# Patient Record
Sex: FEMALE | Race: ASIAN | NOT HISPANIC OR LATINO
[De-identification: names, ages, dates, MRNs, and addresses within clinical notes are randomized per-mention and may not be internally consistent; named-entity substitution may affect disease eponyms.]

---

## 2017-09-11 ENCOUNTER — APPOINTMENT (OUTPATIENT)
Dept: OBGYN | Facility: CLINIC | Age: 52
End: 2017-09-11
Payer: COMMERCIAL

## 2017-09-11 PROCEDURE — 99396 PREV VISIT EST AGE 40-64: CPT

## 2017-10-04 ENCOUNTER — APPOINTMENT (OUTPATIENT)
Dept: ULTRASOUND IMAGING | Facility: IMAGING CENTER | Age: 52
End: 2017-10-04
Payer: COMMERCIAL

## 2017-10-04 ENCOUNTER — APPOINTMENT (OUTPATIENT)
Dept: MAMMOGRAPHY | Facility: IMAGING CENTER | Age: 52
End: 2017-10-04
Payer: COMMERCIAL

## 2017-10-04 ENCOUNTER — OUTPATIENT (OUTPATIENT)
Dept: OUTPATIENT SERVICES | Facility: HOSPITAL | Age: 52
LOS: 1 days | End: 2017-10-04
Payer: COMMERCIAL

## 2017-10-04 DIAGNOSIS — Z00.8 ENCOUNTER FOR OTHER GENERAL EXAMINATION: ICD-10-CM

## 2017-10-04 DIAGNOSIS — Z98.89 OTHER SPECIFIED POSTPROCEDURAL STATES: Chronic | ICD-10-CM

## 2017-10-04 PROCEDURE — 76641 ULTRASOUND BREAST COMPLETE: CPT

## 2017-10-04 PROCEDURE — G0202: CPT | Mod: 26

## 2017-10-04 PROCEDURE — 76641 ULTRASOUND BREAST COMPLETE: CPT | Mod: 26,50

## 2017-10-04 PROCEDURE — 77063 BREAST TOMOSYNTHESIS BI: CPT

## 2017-10-04 PROCEDURE — 77063 BREAST TOMOSYNTHESIS BI: CPT | Mod: 26

## 2017-10-04 PROCEDURE — 77067 SCR MAMMO BI INCL CAD: CPT

## 2017-10-11 DIAGNOSIS — Z12.31 ENCOUNTER FOR SCREENING MAMMOGRAM FOR MALIGNANT NEOPLASM OF BREAST: ICD-10-CM

## 2017-10-11 DIAGNOSIS — N60.02 SOLITARY CYST OF LEFT BREAST: ICD-10-CM

## 2017-10-11 DIAGNOSIS — R92.2 INCONCLUSIVE MAMMOGRAM: ICD-10-CM

## 2017-10-16 ENCOUNTER — TRANSCRIPTION ENCOUNTER (OUTPATIENT)
Age: 52
End: 2017-10-16

## 2018-10-29 ENCOUNTER — APPOINTMENT (OUTPATIENT)
Dept: OBGYN | Facility: CLINIC | Age: 53
End: 2018-10-29
Payer: COMMERCIAL

## 2018-10-29 ENCOUNTER — RESULT REVIEW (OUTPATIENT)
Age: 53
End: 2018-10-29

## 2018-10-29 PROCEDURE — 99396 PREV VISIT EST AGE 40-64: CPT

## 2018-11-17 ENCOUNTER — INPATIENT (INPATIENT)
Facility: HOSPITAL | Age: 53
LOS: 8 days | Discharge: HOME | DRG: 853 | End: 2018-11-26
Attending: STUDENT IN AN ORGANIZED HEALTH CARE EDUCATION/TRAINING PROGRAM | Admitting: STUDENT IN AN ORGANIZED HEALTH CARE EDUCATION/TRAINING PROGRAM
Payer: COMMERCIAL

## 2018-11-17 VITALS
SYSTOLIC BLOOD PRESSURE: 114 MMHG | WEIGHT: 139.99 LBS | OXYGEN SATURATION: 96 % | HEIGHT: 63 IN | HEART RATE: 91 BPM | RESPIRATION RATE: 20 BRPM | DIASTOLIC BLOOD PRESSURE: 73 MMHG

## 2018-11-17 DIAGNOSIS — J18.1 LOBAR PNEUMONIA, UNSPECIFIED ORGANISM: ICD-10-CM

## 2018-11-17 DIAGNOSIS — R09.89 OTHER SPECIFIED SYMPTOMS AND SIGNS INVOLVING THE CIRCULATORY AND RESPIRATORY SYSTEMS: ICD-10-CM

## 2018-11-17 DIAGNOSIS — J90 PLEURAL EFFUSION, NOT ELSEWHERE CLASSIFIED: ICD-10-CM

## 2018-11-17 DIAGNOSIS — Z98.89 OTHER SPECIFIED POSTPROCEDURAL STATES: Chronic | ICD-10-CM

## 2018-11-17 DIAGNOSIS — Z90.711 ACQUIRED ABSENCE OF UTERUS WITH REMAINING CERVICAL STUMP: Chronic | ICD-10-CM

## 2018-11-17 DIAGNOSIS — J98.11 ATELECTASIS: ICD-10-CM

## 2018-11-17 DIAGNOSIS — D47.3 ESSENTIAL (HEMORRHAGIC) THROMBOCYTHEMIA: ICD-10-CM

## 2018-11-17 DIAGNOSIS — A41.9 SEPSIS, UNSPECIFIED ORGANISM: ICD-10-CM

## 2018-11-17 LAB
ALBUMIN SERPL ELPH-MCNC: 4.3 G/DL — SIGNIFICANT CHANGE UP (ref 3.3–5)
ALP SERPL-CCNC: 89 U/L — SIGNIFICANT CHANGE UP (ref 40–120)
ALT FLD-CCNC: 15 U/L — SIGNIFICANT CHANGE UP (ref 10–45)
ANION GAP SERPL CALC-SCNC: 13 MMOL/L — SIGNIFICANT CHANGE UP (ref 5–17)
APPEARANCE UR: ABNORMAL
APTT BLD: 33 SEC — SIGNIFICANT CHANGE UP (ref 27.5–36.3)
AST SERPL-CCNC: 11 U/L — SIGNIFICANT CHANGE UP (ref 10–40)
BACTERIA # UR AUTO: NEGATIVE — SIGNIFICANT CHANGE UP
BASOPHILS # BLD AUTO: 0 K/UL — SIGNIFICANT CHANGE UP (ref 0–0.2)
BASOPHILS NFR BLD AUTO: 0.3 % — SIGNIFICANT CHANGE UP (ref 0–2)
BILIRUB SERPL-MCNC: 0.4 MG/DL — SIGNIFICANT CHANGE UP (ref 0.2–1.2)
BILIRUB UR-MCNC: NEGATIVE — SIGNIFICANT CHANGE UP
BUN SERPL-MCNC: 13 MG/DL — SIGNIFICANT CHANGE UP (ref 7–23)
CALCIUM SERPL-MCNC: 9.7 MG/DL — SIGNIFICANT CHANGE UP (ref 8.4–10.5)
CHLORIDE SERPL-SCNC: 102 MMOL/L — SIGNIFICANT CHANGE UP (ref 96–108)
CO2 SERPL-SCNC: 24 MMOL/L — SIGNIFICANT CHANGE UP (ref 22–31)
COLOR SPEC: YELLOW — SIGNIFICANT CHANGE UP
CREAT SERPL-MCNC: 0.66 MG/DL — SIGNIFICANT CHANGE UP (ref 0.5–1.3)
D DIMER BLD IA.RAPID-MCNC: 619 NG/ML DDU — HIGH
DIFF PNL FLD: NEGATIVE — SIGNIFICANT CHANGE UP
EOSINOPHIL # BLD AUTO: 0.1 K/UL — SIGNIFICANT CHANGE UP (ref 0–0.5)
EOSINOPHIL NFR BLD AUTO: 0.6 % — SIGNIFICANT CHANGE UP (ref 0–6)
EPI CELLS # UR: 17 /HPF — HIGH
GLUCOSE SERPL-MCNC: 152 MG/DL — HIGH (ref 70–99)
GLUCOSE UR QL: NEGATIVE — SIGNIFICANT CHANGE UP
HCT VFR BLD CALC: 42.5 % — SIGNIFICANT CHANGE UP (ref 34.5–45)
HGB BLD-MCNC: 14.2 G/DL — SIGNIFICANT CHANGE UP (ref 11.5–15.5)
HYALINE CASTS # UR AUTO: 11 /LPF — HIGH (ref 0–2)
INR BLD: 1.15 RATIO — SIGNIFICANT CHANGE UP (ref 0.88–1.16)
KETONES UR-MCNC: NEGATIVE — SIGNIFICANT CHANGE UP
LEUKOCYTE ESTERASE UR-ACNC: NEGATIVE — SIGNIFICANT CHANGE UP
LIDOCAIN IGE QN: 33 U/L — SIGNIFICANT CHANGE UP (ref 7–60)
LYMPHOCYTES # BLD AUTO: 1.1 K/UL — SIGNIFICANT CHANGE UP (ref 1–3.3)
LYMPHOCYTES # BLD AUTO: 7.7 % — LOW (ref 13–44)
MCHC RBC-ENTMCNC: 29.8 PG — SIGNIFICANT CHANGE UP (ref 27–34)
MCHC RBC-ENTMCNC: 33.4 GM/DL — SIGNIFICANT CHANGE UP (ref 32–36)
MCV RBC AUTO: 89.2 FL — SIGNIFICANT CHANGE UP (ref 80–100)
MONOCYTES # BLD AUTO: 0.7 K/UL — SIGNIFICANT CHANGE UP (ref 0–0.9)
MONOCYTES NFR BLD AUTO: 5.2 % — SIGNIFICANT CHANGE UP (ref 2–14)
NEUTROPHILS # BLD AUTO: 12 K/UL — HIGH (ref 1.8–7.4)
NEUTROPHILS NFR BLD AUTO: 86.1 % — HIGH (ref 43–77)
NITRITE UR-MCNC: NEGATIVE — SIGNIFICANT CHANGE UP
PH UR: 6.5 — SIGNIFICANT CHANGE UP (ref 5–8)
PLATELET # BLD AUTO: 465 K/UL — HIGH (ref 150–400)
POTASSIUM SERPL-MCNC: 4.4 MMOL/L — SIGNIFICANT CHANGE UP (ref 3.5–5.3)
POTASSIUM SERPL-SCNC: 4.4 MMOL/L — SIGNIFICANT CHANGE UP (ref 3.5–5.3)
PROT SERPL-MCNC: 8.2 G/DL — SIGNIFICANT CHANGE UP (ref 6–8.3)
PROT UR-MCNC: ABNORMAL
PROTHROM AB SERPL-ACNC: 13.2 SEC — HIGH (ref 10–12.9)
RBC # BLD: 4.76 M/UL — SIGNIFICANT CHANGE UP (ref 3.8–5.2)
RBC # FLD: 11.8 % — SIGNIFICANT CHANGE UP (ref 10.3–14.5)
RBC CASTS # UR COMP ASSIST: 12 /HPF — HIGH (ref 0–4)
SODIUM SERPL-SCNC: 139 MMOL/L — SIGNIFICANT CHANGE UP (ref 135–145)
SP GR SPEC: 1.02 — SIGNIFICANT CHANGE UP (ref 1.01–1.02)
TROPONIN T, HIGH SENSITIVITY RESULT: <6 NG/L — SIGNIFICANT CHANGE UP (ref 0–51)
UROBILINOGEN FLD QL: NEGATIVE — SIGNIFICANT CHANGE UP
WBC # BLD: 13.9 K/UL — HIGH (ref 3.8–10.5)
WBC # FLD AUTO: 13.9 K/UL — HIGH (ref 3.8–10.5)
WBC UR QL: 16 /HPF — HIGH (ref 0–5)

## 2018-11-17 PROCEDURE — 71275 CT ANGIOGRAPHY CHEST: CPT | Mod: 26

## 2018-11-17 PROCEDURE — 99223 1ST HOSP IP/OBS HIGH 75: CPT

## 2018-11-17 PROCEDURE — 71046 X-RAY EXAM CHEST 2 VIEWS: CPT | Mod: 26

## 2018-11-17 PROCEDURE — 99284 EMERGENCY DEPT VISIT MOD MDM: CPT

## 2018-11-17 RX ORDER — CEFTRIAXONE 500 MG/1
1 INJECTION, POWDER, FOR SOLUTION INTRAMUSCULAR; INTRAVENOUS EVERY 24 HOURS
Qty: 0 | Refills: 0 | Status: DISCONTINUED | OUTPATIENT
Start: 2018-11-18 | End: 2018-11-18

## 2018-11-17 RX ORDER — AZITHROMYCIN 500 MG/1
500 TABLET, FILM COATED ORAL ONCE
Qty: 0 | Refills: 0 | Status: COMPLETED | OUTPATIENT
Start: 2018-11-17 | End: 2018-11-17

## 2018-11-17 RX ORDER — CHOLECALCIFEROL (VITAMIN D3) 125 MCG
0 CAPSULE ORAL
Qty: 0 | Refills: 0 | COMMUNITY

## 2018-11-17 RX ORDER — AZITHROMYCIN 500 MG/1
500 TABLET, FILM COATED ORAL AT BEDTIME
Qty: 0 | Refills: 0 | Status: DISCONTINUED | OUTPATIENT
Start: 2018-11-18 | End: 2018-11-19

## 2018-11-17 RX ORDER — FAMOTIDINE 10 MG/ML
20 INJECTION INTRAVENOUS ONCE
Qty: 0 | Refills: 0 | Status: COMPLETED | OUTPATIENT
Start: 2018-11-17 | End: 2018-11-17

## 2018-11-17 RX ORDER — L.ACIDOPH/B.ANIMALIS/B.LONGUM 15B CELL
0 CAPSULE ORAL
Qty: 0 | Refills: 0 | COMMUNITY

## 2018-11-17 RX ORDER — VITAMIN E 100 UNIT
0 CAPSULE ORAL
Qty: 0 | Refills: 0 | COMMUNITY

## 2018-11-17 RX ORDER — ACETAMINOPHEN 500 MG
975 TABLET ORAL ONCE
Qty: 0 | Refills: 0 | Status: COMPLETED | OUTPATIENT
Start: 2018-11-17 | End: 2018-11-17

## 2018-11-17 RX ORDER — CEFTRIAXONE 500 MG/1
1 INJECTION, POWDER, FOR SOLUTION INTRAMUSCULAR; INTRAVENOUS ONCE
Qty: 0 | Refills: 0 | Status: COMPLETED | OUTPATIENT
Start: 2018-11-17 | End: 2018-11-17

## 2018-11-17 RX ORDER — ASPIRIN/CALCIUM CARB/MAGNESIUM 324 MG
1 TABLET ORAL
Qty: 0 | Refills: 0 | COMMUNITY

## 2018-11-17 RX ORDER — UBIDECARENONE 100 MG
0 CAPSULE ORAL
Qty: 0 | Refills: 0 | COMMUNITY

## 2018-11-17 RX ORDER — KETOROLAC TROMETHAMINE 30 MG/ML
15 SYRINGE (ML) INJECTION ONCE
Qty: 0 | Refills: 0 | Status: DISCONTINUED | OUTPATIENT
Start: 2018-11-17 | End: 2018-11-17

## 2018-11-17 RX ORDER — SODIUM CHLORIDE 9 MG/ML
1000 INJECTION INTRAMUSCULAR; INTRAVENOUS; SUBCUTANEOUS
Qty: 0 | Refills: 0 | Status: DISCONTINUED | OUTPATIENT
Start: 2018-11-17 | End: 2018-11-18

## 2018-11-17 RX ORDER — INFLUENZA VIRUS VACCINE 15; 15; 15; 15 UG/.5ML; UG/.5ML; UG/.5ML; UG/.5ML
0.5 SUSPENSION INTRAMUSCULAR ONCE
Qty: 0 | Refills: 0 | Status: COMPLETED | OUTPATIENT
Start: 2018-11-17 | End: 2018-11-26

## 2018-11-17 RX ORDER — ACETAMINOPHEN 500 MG
650 TABLET ORAL EVERY 6 HOURS
Qty: 0 | Refills: 0 | Status: DISCONTINUED | OUTPATIENT
Start: 2018-11-17 | End: 2018-11-20

## 2018-11-17 RX ADMIN — CEFTRIAXONE 1 GRAM(S): 500 INJECTION, POWDER, FOR SOLUTION INTRAMUSCULAR; INTRAVENOUS at 15:36

## 2018-11-17 RX ADMIN — Medication 975 MILLIGRAM(S): at 16:30

## 2018-11-17 RX ADMIN — SODIUM CHLORIDE 125 MILLILITER(S): 9 INJECTION INTRAMUSCULAR; INTRAVENOUS; SUBCUTANEOUS at 20:14

## 2018-11-17 RX ADMIN — CEFTRIAXONE 100 GRAM(S): 500 INJECTION, POWDER, FOR SOLUTION INTRAMUSCULAR; INTRAVENOUS at 14:58

## 2018-11-17 RX ADMIN — FAMOTIDINE 20 MILLIGRAM(S): 10 INJECTION INTRAVENOUS at 11:51

## 2018-11-17 RX ADMIN — AZITHROMYCIN 250 MILLIGRAM(S): 500 TABLET, FILM COATED ORAL at 15:36

## 2018-11-17 RX ADMIN — Medication 15 MILLIGRAM(S): at 16:54

## 2018-11-17 RX ADMIN — Medication 975 MILLIGRAM(S): at 15:37

## 2018-11-17 NOTE — H&P ADULT - HISTORY OF PRESENT ILLNESS
52yo female with no prior hx, presented to the ED with worsening left sided chest pain. Pt states earlier this week she developed left shoulder pain radiating to her left side of her chest and her back. She initially assumed cause of her pain was due to musculoskeletal etiology, ie frozen shoulder, from sleeping in a wrong position. Today this AM, pt noted sudden onset sharp pain on the left side of her chest, radiating to her back. She denied sob, dizziness, blurry vision, N/V, palpitations. Pt also denies symptoms of fever or chills throughout this week. Pt was concerned about cardiac related etiology and therefore decided to come to the ED. She states she took 2 baby ASA. She denies any relieving and aggravating factors.

## 2018-11-17 NOTE — ED ADULT TRIAGE NOTE - CHIEF COMPLAINT QUOTE
Patient came to the ED c/o left sided chest pain radiating to the left shoulder and left upper back after making breakfast this morning about 30 minutes PTA.  Patient said she became diaphoretic and pain has not subsided.  Patient took 2 81mg ASA PTA.  Patient has no known cardiac history. Patient came to the ED c/o left sided chest pain radiating to the left shoulder and left upper back after making breakfast this morning about 30 minutes PTA.  Patient said she became diaphoretic and pain has not subsided.  Patient took 2 81mg ASA PTA.  Patient has no known cardiac history and denies injury or trauma.

## 2018-11-17 NOTE — ED PROVIDER NOTE - CARE PLAN
Principal Discharge DX:	Loculated pleural effusion Principal Discharge DX:	Pneumonia of left lower lobe due to infectious organism  Secondary Diagnosis:	Loculated pleural effusion

## 2018-11-17 NOTE — ED ADULT NURSE REASSESSMENT NOTE - NS ED NURSE REASSESS COMMENT FT1
pt reassessed, and vitals in flowsheet, Pt still reporting chest pain, and vitals show fever.  100.0 md barbosa notified

## 2018-11-17 NOTE — H&P ADULT - ASSESSMENT
52yo female with no prior hx, presented to the ED with worsening left sided chest pain and shoulder pain ruled out ACS in the ED negative findings on EKG and negative Trops, ruled out PE with CTA Chest. CTA Chest findings of Loculated Moderate pleural effusion, concerning for Pneumonia.

## 2018-11-17 NOTE — PATIENT PROFILE ADULT - NSPROMUTANXFEARFT_GEN_A_NUR
pt eager to speak with doctor. Wants to be treated as quickly as possible to return home to her children.

## 2018-11-17 NOTE — H&P ADULT - PROBLEM SELECTOR PLAN 3
Scc Ka Subtype Histology Text: On low power, a keratin filled invagination of the epidermis is noted to penetrate into the dermis.  There appear to be collections of epidermal cells with nuclear atypia in and around the base of the invagination.  These dyskeratotic cells show individual cells keratinization and appear eosinophilic.  A pronounced inflammatory cell infiltrate is noted in the surrounding dermis.  The surrounding epidermis extends as a buttress over the crater formed by the invagination.  At high magnification, the epidermal proliferations at the base of the crater show enlarged epidermal cells with hyperchromatic nuclei, consistent with SCC keratoacanthoma subtype. -CTA Chest findings for Loculated Moderate size pleural effusion  -This is likely secondary to Pneumonia  -Discuss case with pulmonology for Thoracentesis -CTA Chest findings for Loculated Moderate size pleural effusion  -This is likely secondary to Pneumonia  -I personally discussed the pt's case with pts PMD, Dr. Blanquita Jacob regarding Thoracentesis for Loculated pleural effusion. Dr. Jacob agrees and will contact Pulmonology.

## 2018-11-17 NOTE — ED ADULT NURSE NOTE - OBJECTIVE STATEMENT
52 YO Female complaining of chest pain that started at 1000 this morning while she was cooking. PMH of HLD. Pt states she has pain in her midsternal region that radiates to her back via her left side.  Pt states pain with inspiration, SOB, Chills.  Pt denies NVD, Dizziness, blurry vision, palpitations, no dysuria, no hematuria. Pt has abdominal pain with palpation in epigastric region. Pt had BM this morning, with no abnormalities. Pt took 2 baby ASA this morning.  Pt laying in bed, call bell in hand, and educated to call for assistance.

## 2018-11-17 NOTE — ED PROVIDER NOTE - CONSTITUTIONAL, MLM
normal... Well appearing, well nourished, awake, alert, oriented to person, place, time/situation and in moderate pain upon entering room.

## 2018-11-17 NOTE — ED ADULT NURSE REASSESSMENT NOTE - NS ED NURSE REASSESS COMMENT FT1
s/w ALEXUS Rizo in admitting regarding patients BP. Patient asymptomatic of low BP- not dizzy/lightheaded. States her normal BP is 90s/60s. Darrius states no intervention at this time.

## 2018-11-17 NOTE — ED PROVIDER NOTE - ATTENDING CONTRIBUTION TO CARE
54 yo female presents with with substernal chest pain since this morning. See HPI. PE: Leaning forward, taking shallow breaths, uncomfortable due to pain, lungs clear, RRR, ab soft, +mild epigastric tenderness, no rebound or guarding, neg Covington's sign. Low suspicion for ACS given pleuritic nature of symptoms, however will eval. Pericarditis possible although not indicative on EKG. Possible GI related will treat empirically. Cannot exclude PE given age, will send D-dimer. Labs, ekg, trop, d-dimer, CXR, analgesia, reevaluate.

## 2018-11-17 NOTE — ED ADULT NURSE NOTE - NSIMPLEMENTINTERV_GEN_ALL_ED
Implemented All Universal Safety Interventions:  [place] to call system. Call bell, personal items and telephone within reach. Instruct patient to call for assistance. Room bathroom lighting operational. Non-slip footwear when patient is off stretcher. Physically safe environment: no spills, clutter or unnecessary equipment. Stretcher in lowest position, wheels locked, appropriate side rails in place.

## 2018-11-17 NOTE — ED ADULT NURSE REASSESSMENT NOTE - NS ED NURSE REASSESS COMMENT FT1
Abx hung, and pt refused pain medication at this time.  MD Dykes notified Abx hung, and pt refused pain medication at this time.  MD Dykes notified, blood cultures drawn prior to ABX administered

## 2018-11-17 NOTE — ED PROVIDER NOTE - MEDICAL DECISION MAKING DETAILS
Naif Rodney (Resident): 52 y/o female p/w sudden onset CP - on exam some epigastric tenderness - EKG WNL, low risk for ACS but patient does look unfomrotable from pain - ddx included gall bladder pathjology/pancreatitis - low concern for pneumothorax, good aeration and good sat - will r/o for ACS - given pleuritc CP and sudden onset, concern for DVT, but again, lower risk so will Dimer first

## 2018-11-17 NOTE — ED PROVIDER NOTE - OBJECTIVE STATEMENT
54 y/o female hx of HTN presents with chest pain. States she was making breakfast and then developed some sudden onset CP in midsternal area radiating up into neck and L arm. Associated diaphoresis. Was only able to drink some chocolate milk - this did not occur after eating. No F/c, N/V/D. Took 2 aspirin for the pain prior to arrival. Pain worse with deep inspiration. No LE swelling. No hx of DVT or PE.

## 2018-11-17 NOTE — ED ADULT NURSE NOTE - CHIEF COMPLAINT QUOTE
Patient came to the ED c/o left sided chest pain radiating to the left shoulder and left upper back after making breakfast this morning about 30 minutes PTA.  Patient said she became diaphoretic and pain has not subsided.  Patient took 2 81mg ASA PTA.  Patient has no known cardiac history and denies injury or trauma.

## 2018-11-18 ENCOUNTER — RESULT REVIEW (OUTPATIENT)
Age: 53
End: 2018-11-18

## 2018-11-18 LAB
ANION GAP SERPL CALC-SCNC: 13 MMOL/L — SIGNIFICANT CHANGE UP (ref 5–17)
BASOPHILS # BLD AUTO: 0.02 K/UL — SIGNIFICANT CHANGE UP (ref 0–0.2)
BASOPHILS NFR BLD AUTO: 0.1 % — SIGNIFICANT CHANGE UP (ref 0–2)
BUN SERPL-MCNC: 14 MG/DL — SIGNIFICANT CHANGE UP (ref 7–23)
CALCIUM SERPL-MCNC: 8.5 MG/DL — SIGNIFICANT CHANGE UP (ref 8.4–10.5)
CHLORIDE SERPL-SCNC: 106 MMOL/L — SIGNIFICANT CHANGE UP (ref 96–108)
CO2 SERPL-SCNC: 20 MMOL/L — LOW (ref 22–31)
CREAT SERPL-MCNC: 0.63 MG/DL — SIGNIFICANT CHANGE UP (ref 0.5–1.3)
EOSINOPHIL # BLD AUTO: 0.01 K/UL — SIGNIFICANT CHANGE UP (ref 0–0.5)
EOSINOPHIL NFR BLD AUTO: 0.1 % — SIGNIFICANT CHANGE UP (ref 0–6)
GLUCOSE SERPL-MCNC: 125 MG/DL — HIGH (ref 70–99)
HCT VFR BLD CALC: 33 % — LOW (ref 34.5–45)
HGB BLD-MCNC: 11.1 G/DL — LOW (ref 11.5–15.5)
IMM GRANULOCYTES NFR BLD AUTO: 0.3 % — SIGNIFICANT CHANGE UP (ref 0–1.5)
LEGIONELLA AG UR QL: NEGATIVE — SIGNIFICANT CHANGE UP
LEGIONELLA AG UR QL: NEGATIVE — SIGNIFICANT CHANGE UP
LYMPHOCYTES # BLD AUTO: 0.88 K/UL — LOW (ref 1–3.3)
LYMPHOCYTES # BLD AUTO: 5.9 % — LOW (ref 13–44)
MCHC RBC-ENTMCNC: 30 PG — SIGNIFICANT CHANGE UP (ref 27–34)
MCHC RBC-ENTMCNC: 33.6 GM/DL — SIGNIFICANT CHANGE UP (ref 32–36)
MCV RBC AUTO: 89.2 FL — SIGNIFICANT CHANGE UP (ref 80–100)
MONOCYTES # BLD AUTO: 0.98 K/UL — HIGH (ref 0–0.9)
MONOCYTES NFR BLD AUTO: 6.5 % — SIGNIFICANT CHANGE UP (ref 2–14)
NEUTROPHILS # BLD AUTO: 13.09 K/UL — HIGH (ref 1.8–7.4)
NEUTROPHILS NFR BLD AUTO: 87.1 % — HIGH (ref 43–77)
PLATELET # BLD AUTO: 369 K/UL — SIGNIFICANT CHANGE UP (ref 150–400)
POTASSIUM SERPL-MCNC: 3.3 MMOL/L — LOW (ref 3.5–5.3)
POTASSIUM SERPL-SCNC: 3.3 MMOL/L — LOW (ref 3.5–5.3)
RBC # BLD: 3.7 M/UL — LOW (ref 3.8–5.2)
RBC # FLD: 12.9 % — SIGNIFICANT CHANGE UP (ref 10.3–14.5)
SODIUM SERPL-SCNC: 139 MMOL/L — SIGNIFICANT CHANGE UP (ref 135–145)
WBC # BLD: 15.02 K/UL — HIGH (ref 3.8–10.5)
WBC # FLD AUTO: 15.02 K/UL — HIGH (ref 3.8–10.5)

## 2018-11-18 PROCEDURE — 71045 X-RAY EXAM CHEST 1 VIEW: CPT | Mod: 26,59

## 2018-11-18 PROCEDURE — 99254 IP/OBS CNSLTJ NEW/EST MOD 60: CPT | Mod: 57

## 2018-11-18 PROCEDURE — 32557 INSERT CATH PLEURA W/ IMAGE: CPT | Mod: LT

## 2018-11-18 PROCEDURE — 88112 CYTOPATH CELL ENHANCE TECH: CPT | Mod: 26

## 2018-11-18 PROCEDURE — 88305 TISSUE EXAM BY PATHOLOGIST: CPT | Mod: 26

## 2018-11-18 RX ORDER — OXYCODONE HYDROCHLORIDE 5 MG/1
5 TABLET ORAL ONCE
Qty: 0 | Refills: 0 | Status: DISCONTINUED | OUTPATIENT
Start: 2018-11-18 | End: 2018-11-18

## 2018-11-18 RX ORDER — AMPICILLIN SODIUM AND SULBACTAM SODIUM 250; 125 MG/ML; MG/ML
3 INJECTION, POWDER, FOR SUSPENSION INTRAMUSCULAR; INTRAVENOUS ONCE
Qty: 0 | Refills: 0 | Status: COMPLETED | OUTPATIENT
Start: 2018-11-18 | End: 2018-11-18

## 2018-11-18 RX ORDER — POTASSIUM CHLORIDE 20 MEQ
40 PACKET (EA) ORAL EVERY 4 HOURS
Qty: 0 | Refills: 0 | Status: DISCONTINUED | OUTPATIENT
Start: 2018-11-18 | End: 2018-11-18

## 2018-11-18 RX ORDER — POTASSIUM CHLORIDE 20 MEQ
20 PACKET (EA) ORAL ONCE
Qty: 0 | Refills: 0 | Status: COMPLETED | OUTPATIENT
Start: 2018-11-18 | End: 2018-11-18

## 2018-11-18 RX ORDER — AMPICILLIN SODIUM AND SULBACTAM SODIUM 250; 125 MG/ML; MG/ML
INJECTION, POWDER, FOR SUSPENSION INTRAMUSCULAR; INTRAVENOUS
Qty: 0 | Refills: 0 | Status: DISCONTINUED | OUTPATIENT
Start: 2018-11-18 | End: 2018-11-24

## 2018-11-18 RX ORDER — SODIUM CHLORIDE 9 MG/ML
1000 INJECTION INTRAMUSCULAR; INTRAVENOUS; SUBCUTANEOUS
Qty: 0 | Refills: 0 | Status: DISCONTINUED | OUTPATIENT
Start: 2018-11-18 | End: 2018-11-20

## 2018-11-18 RX ORDER — AMPICILLIN SODIUM AND SULBACTAM SODIUM 250; 125 MG/ML; MG/ML
3 INJECTION, POWDER, FOR SUSPENSION INTRAMUSCULAR; INTRAVENOUS EVERY 6 HOURS
Qty: 0 | Refills: 0 | Status: DISCONTINUED | OUTPATIENT
Start: 2018-11-18 | End: 2018-11-24

## 2018-11-18 RX ADMIN — SODIUM CHLORIDE 125 MILLILITER(S): 9 INJECTION INTRAMUSCULAR; INTRAVENOUS; SUBCUTANEOUS at 13:36

## 2018-11-18 RX ADMIN — AMPICILLIN SODIUM AND SULBACTAM SODIUM 200 GRAM(S): 250; 125 INJECTION, POWDER, FOR SUSPENSION INTRAMUSCULAR; INTRAVENOUS at 13:37

## 2018-11-18 RX ADMIN — AMPICILLIN SODIUM AND SULBACTAM SODIUM 200 GRAM(S): 250; 125 INJECTION, POWDER, FOR SUSPENSION INTRAMUSCULAR; INTRAVENOUS at 18:32

## 2018-11-18 RX ADMIN — AMPICILLIN SODIUM AND SULBACTAM SODIUM 200 GRAM(S): 250; 125 INJECTION, POWDER, FOR SUSPENSION INTRAMUSCULAR; INTRAVENOUS at 23:20

## 2018-11-18 RX ADMIN — OXYCODONE HYDROCHLORIDE 5 MILLIGRAM(S): 5 TABLET ORAL at 20:27

## 2018-11-18 RX ADMIN — SODIUM CHLORIDE 75 MILLILITER(S): 9 INJECTION INTRAMUSCULAR; INTRAVENOUS; SUBCUTANEOUS at 18:32

## 2018-11-18 RX ADMIN — Medication 650 MILLIGRAM(S): at 13:37

## 2018-11-18 RX ADMIN — OXYCODONE HYDROCHLORIDE 5 MILLIGRAM(S): 5 TABLET ORAL at 21:00

## 2018-11-18 RX ADMIN — Medication 20 MILLIEQUIVALENT(S): at 13:52

## 2018-11-18 RX ADMIN — AZITHROMYCIN 500 MILLIGRAM(S): 500 TABLET, FILM COATED ORAL at 23:20

## 2018-11-18 NOTE — PROGRESS NOTE ADULT - SUBJECTIVE AND OBJECTIVE BOX
Vascular & Interventional Radiology Post-Procedure Note    Pre-Procedure Diagnosis: left pleural effusion   Post-Procedure Diagnosis: Same as pre.  Indications for Procedure: SIRS    Attending: Dr. Kevin  Resident: Dr. Rao    Procedure Details/Findings: Loculated left effusion. 10.2F pigtail catheter placed under US guidance.   Access (if applicable): Left mid back.     Complications: none  Estimated Blood Loss: Minimal  Specimen: sent for culture  Contrast: none  Sedation: none  Patient Condition/Disposition: stable, back to floor.    Plan:  - f/u X-ray prelim: no pneumothorax  - To water seal tonight. Consider low wall suction tomorrow.  - Given the loculations on US and turbid yellow appearance of pleural fluid, this likely represents infection, would continue with Abx.

## 2018-11-18 NOTE — CONSULT NOTE ADULT - SUBJECTIVE AND OBJECTIVE BOX
PULMONARY CONSULT  Preez Albert MD  [phone]    Initial HPI on admission:  HPI:  54yo female with no prior hx, presented to the ED with worsening left sided chest pain. Pt states earlier this week she developed left shoulder pain radiating to her left side of her chest and her back. She initially assumed cause of her pain was due to musculoskeletal etiology, ie frozen shoulder, from sleeping in a wrong position. Today this AM, pt noted sudden onset sharp pain on the left side of her chest, radiating to her back. She denied sob, dizziness, blurry vision, N/V, palpitations. Pt also denies symptoms of fever or chills throughout this week. Pt was concerned about cardiac related etiology and therefore decided to come to the ED. She states she took 2 baby ASA. She denies any relieving and aggravating factors. (2018 15:36)    Patient [place] at age 28 from [facility], [place] born. She denies history of TB/contact in extended family. 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 She is a non smoker. Patient noticed dull plain in L chest laterally and L shoulder pain over week PTA, increased significantly over day PTA. She denied URI, cough, sweats, fever over weeks PTA. She is a non smoker. She denies prior history of pneumonia. She has yearly mamograms which have been negative. Partial hysterectomy for pelvic floor, non malignant. Patient started on Ceftriaxone and Azithromycin in ER. Patient reportedly with temp 102 in ER      PAST MEDICAL & SURGICAL HISTORY:  Herpes zoster  Uterine prolapse  Thyroid nodule  Dyslipidemia  Acoustic neuroma  S/P partial hysterectomy  S/P brain surgery: acoutic neuromia  S/P  section:     Allergies    latex (Rash)  No Known Drug Allergies    FAMILY HISTORY:  No pertinent family history in first degree relatives    Social history: Non smoker,  with 2 children    Medications:  MEDICATIONS  (STANDING):  azithromycin   Tablet 500 milliGRAM(s) Oral at bedtime  cefTRIAXone   IVPB 1 Gram(s) IV Intermittent every 24 hours  influenza   Vaccine 0.5 milliLiter(s) IntraMuscular once  potassium chloride    Tablet ER 20 milliEquivalent(s) Oral once  sodium chloride 0.9%. 1000 milliLiter(s) (125 mL/Hr) IV Continuous <Continuous>    MEDICATIONS  (PRN):  acetaminophen   Tablet .. 650 milliGRAM(s) Oral every 6 hours PRN Temp greater or equal to 38C (100.4F), Moderate Pain (4 - 6)    Vital Signs Last 24 Hrs  T(C): 37.2 (2018 05:20), Max: 39.1 (2018 14:17)  T(F): 99 (2018 05:20), Max: 102.3 (2018 14:17)  HR: 100 (2018 05:20) (89 - 100)  BP: 104/63 (2018 05:20) (96/60 - 121/74)  BP(mean): --  RR: 18 (2018 05:20) (16 - 20)  SpO2: 95% (2018 05:20) (94% - 96%)       @ 07:01  -   @ 07:00  --------------------------------------------------------  IN: 1860 mL / OUT: 0 mL / NET: 1860 mL      LABS:                        14.2   13.9  )-----------( 465      ( 2018 11:47 )             42.5     -18    139  |  106  |  14  ----------------------------<  125<H>  3.3<L>   |  20<L>  |  0.63    Ca    8.5      2018 07:49    TPro  8.2  /  Alb  4.3  /  TBili  0.4  /  DBili  x   /  AST  11  /  ALT  15  /  AlkPhos  89  -17      PT/INR - ( 2018 11:47 )   PT: 13.2 sec;   INR: 1.15 ratio         PTT - ( 2018 11:47 )  PTT:33.0 sec  Urinalysis Basic - ( 2018 13:57 )    Color: Yellow / Appearance: Turbid / S.021 / pH: x  Gluc: x / Ketone: Negative  / Bili: Negative / Urobili: Negative   Blood: x / Protein: 30 mg/dL / Nitrite: Negative   Leuk Esterase: Negative / RBC: 12 /hpf / WBC 16 /hpf   Sq Epi: x / Non Sq Epi: 17 /hpf / Bacteria: Negative      Physical Examination:    General: Alert, non toxic; c/o L sided CP. O2 sat 92% on RA.      HEENT: Pupils equal, reactive to light.  Symmetric.    PULM: Clear to auscultation bilaterally, no significant sputum production    CVS: Regular rate and rhythm, no murmurs, rubs, or gallops    ABD: Soft, nondistended, nontender, normoactive bowel sounds, no masses    EXT: No edema, nontender    SKIN: Warm and well perfused, no rashes noted.    NEURO: Alert, oriented, interactive, nonfocal    RADIOLOGY REVIEWED PERSONALLY  CXR:    CT chest:  < from: CT Angio Chest w/ IV Cont (18 @ 13:54) >  EXAM:  CT ANGIO CHEST (W)AW IC                            PROCEDURE DATE:  2018            INTERPRETATION:  CLINICAL INFORMATION: Elevated d-dimer. Pain with   breathing.    COMPARISON: None.    PROCEDURE:   CT Angiography of the Chest.  90 ml of Omnipaque 350 was injected intravenously. 10 ml were discarded.  Sagittal and coronal reformats were performed as well as 3D (MIP)   reconstructions.      FINDINGS:    CHEST:     LUNGS AND LARGE AIRWAYS: Mucoid impaction of the distal airways of the   left lower lobe with groundglass opacities. Subsegmental atelectasis in   the right lower lobe, and passive compressive atelectasis in the left   lower lobe.    PLEURA: Loculated moderate left pleural effusion. No pneumothorax.    VESSELS: No pulmonary embolism. No thoracic aortic aneurysm.    HEART: Heart size is normal. No pericardial effusion.    MEDIASTINUM AND GALE: No lymphadenopathy.    CHEST WALL AND LOWER NECK: Within normal limits.    VISUALIZED UPPER ABDOMEN: Within normal limits.    BONES: Within normal limits.    IMPRESSION:     No pulmonary embolism.    Loculated moderate left pleural effusion with associated passive   compressive atelectasis in the left lower lobe.      TTE:      Assessment:    Plan:

## 2018-11-18 NOTE — CONSULT NOTE ADULT - SUBJECTIVE AND OBJECTIVE BOX
HPI:  52yo female with no prior hx, presented to the ED with worsening left sided chest pain. Pt states earlier this week she developed left shoulder pain radiating to her left side of her chest and her back. She initially assumed cause of her pain was due to musculoskeletal etiology, ie frozen shoulder, from sleeping in a wrong position. Today this AM, pt noted sudden onset sharp pain on the left side of her chest, radiating to her back. She denied sob, dizziness, blurry vision, N/V, palpitations. Pt also denies symptoms of fever or chills throughout this week. Pt was concerned about cardiac related etiology and therefore decided to come to the ED. She states she took 2 baby ASA. She denies any relieving and aggravating factors. (2018 15:36)      PAST MEDICAL & SURGICAL HISTORY:  Herpes zoster  Uterine prolapse  Thyroid nodule  Dyslipidemia  Acoustic neuroma  S/P partial hysterectomy  S/P brain surgery: acoutic neuromia  S/P  section:       REVIEW OF SYSTEMS      General:No Weight change/ Fatigue/ HA/Dizzy	    Skin/Breast: No Rashes/ Lesions/ Masses  	  Ophthalmologic: No Blurry vision/ Glaucoma/ Blindness  	  ENMT: No Hearing loss/ Drainage/ Lesions	    Respiratory and Thorax: No Cough/ no Wheezing/ +SOB/ No Hemoptysis/ No Sputum production  	  Cardiovascular: No Chest pain/ Palpitations/ Diaphoresis	    Gastrointestinal: No Nausea/ Vomiting/ Constipation/ Appetite Change	    Genitourinary: No Heamturia/ Dysuria/ Frequency change/ Impotence	    Musculoskeletal: No Pain/ Weakness/ Claudication	    Neurological: No Seizures/ TIA/CVA/ Parastesias	    Psychiatric: No Dementia/ Depression/ SI/HI	    Hematology/Lymphatics: No hx of bleeding/ Edema	    Endocrine:	No Hyperglycemia/ Hypoglycemia    Allergic/Immunologic:	 No Anaphylaxis/ Intolerance/ Recent illnesses    MEDICATIONS  (STANDING):  ampicillin/sulbactam  IVPB 3 Gram(s) IV Intermittent every 6 hours  ampicillin/sulbactam  IVPB      azithromycin   Tablet 500 milliGRAM(s) Oral at bedtime  influenza   Vaccine 0.5 milliLiter(s) IntraMuscular once  sodium chloride 0.9%. 1000 milliLiter(s) (125 mL/Hr) IV Continuous <Continuous>    MEDICATIONS  (PRN):  acetaminophen   Tablet .. 650 milliGRAM(s) Oral every 6 hours PRN Temp greater or equal to 38C (100.4F), Moderate Pain (4 - 6)      Allergies    latex (Rash)  No Known Drug Allergies    Intolerances        SOCIAL HISTORY:    FAMILY HISTORY:  No pertinent family history in first degree relatives      Vital Signs Last 24 Hrs  T(C): 38.7 (2018 12:40), Max: 38.7 (2018 12:40)  T(F): 101.7 (2018 12:40), Max: 101.7 (2018 12:40)  HR: 101 (2018 12:40) (89 - 101)  BP: 110/66 (2018 12:40) (96/60 - 110/66)  BP(mean): --  RR: 18 (2018 12:40) (16 - 18)  SpO2: 93% (2018 12:40) (93% - 96%)    General: WN/WD NAD  Neurology: Awake, nonfocal, BARROS x 4  Eyes: Scleras clear, PERRLA/ EOMI, Gross vision intact  ENT:Gross hearing intact, grossly patent pharynx, no stridor  Neck: Neck supple, trachea midline, No JVD,   Respiratory: diminished to LL lobe, No wheezing, rales, rhonchi  CV: RRR, S1S2, no murmurs, rubs or gallops  Abdominal: Soft, NT, ND +BS,   Extremities: No edema, + peripheral pulses  Skin: No Rashes, Hematoma, Ecchymosis  Lymphatic: No Neck, axilla, groin LAD  Psych: Oriented x 3, normal affect  Incisions: n/a  Tubes: n/a    LABS:                        11.1   15.02 )-----------( 369      ( 2018 11:31 )             33.0         139  |  106  |  14  ----------------------------<  125<H>  3.3<L>   |  20<L>  |  0.63    Ca    8.5      2018 07:49    TPro  8.2  /  Alb  4.3  /  TBili  0.4  /  DBili  x   /  AST  11  /  ALT  15  /  AlkPhos  89  11-17    PT/INR - ( 2018 11:47 )   PT: 13.2 sec;   INR: 1.15 ratio         PTT - ( 2018 11:47 )  PTT:33.0 sec  Urinalysis Basic - ( 2018 13:57 )    Color: Yellow / Appearance: Turbid / S.021 / pH: x  Gluc: x / Ketone: Negative  / Bili: Negative / Urobili: Negative   Blood: x / Protein: 30 mg/dL / Nitrite: Negative   Leuk Esterase: Negative / RBC: 12 /hpf / WBC 16 /hpf   Sq Epi: x / Non Sq Epi: 17 /hpf / Bacteria: Negative        RADIOLOGY & ADDITIONAL STUDIES:    < from: CT Angio Chest w/ IV Cont (18 @ 13:54) >    EXAM:  CT ANGIO CHEST (W)AW IC                            PROCEDURE DATE:  2018            INTERPRETATION:  CLINICAL INFORMATION: Elevated d-dimer. Pain with   breathing.    COMPARISON: None.    PROCEDURE:   CT Angiography of the Chest.  90 ml of Omnipaque 350 was injected intravenously. 10 ml were discarded.  Sagittal and coronal reformats were performed as well as 3D (MIP)   reconstructions.      FINDINGS:    CHEST:     LUNGS AND LARGE AIRWAYS: Mucoid impaction of the distal airways of the   left lower lobe with groundglass opacities. Subsegmental atelectasis in   the right lower lobe, and passive compressive atelectasis in the left   lower lobe.    PLEURA: Loculated moderate left pleural effusion. No pneumothorax.    VESSELS: No pulmonary embolism. No thoracic aortic aneurysm.    HEART: Heart size is normal. No pericardial effusion.    MEDIASTINUM AND GALE: No lymphadenopathy.    CHEST WALL AND LOWER NECK: Within normal limits.    VISUALIZED UPPER ABDOMEN: Within normal limits.    BONES: Within normal limits.    IMPRESSION:     No pulmonary embolism.    Loculated moderate left pleural effusion with associated passive   compressive atelectasis in the left lower lobe.    < end of copied text >      ASSESSMENT:   53yFemalePAST MEDICAL & SURGICAL HISTORY:  Herpes zoster  Uterine prolapse  Thyroid nodule  Dyslipidemia  Acoustic neuroma  S/P partial hysterectomy  S/P brain surgery: acoutic neuromia  S/P  section:   HEALTH ISSUES - PROBLEM Dx:  Thrombocytosis: Thrombocytosis  Suspected pulmonary embolism  Atelectasis: Atelectasis  Loculated pleural effusion: Loculated pleural effusion  Pneumonia of left lower lobe due to infectious organism: Pneumonia of left lower lobe due to infectious organism  Sepsis, due to unspecified organism: Sepsis, due to unspecified organism      HEALTH ISSUES - R/O PROBLEM Dx:      PLAN:

## 2018-11-18 NOTE — CONSULT NOTE ADULT - ASSESSMENT
52yo female with Febrile illness with probable LLL consolidation c/w pneumonia and minimally loculated simple L parapneumonic effusion.     Plan: CT scan reviewed by Dr. Ward. Effusion is small symptoms likely associated with intraparenchymal pneumonia. No need for pigtail catheter at this time per Dr. Ward. May need a vats with decort. Will d/w Dr. Albert.  Type and screen times 2  serum HCG

## 2018-11-18 NOTE — PROGRESS NOTE ADULT - ASSESSMENT
52yo female with no prior hx, presented to the ED with worsening left sided chest pain and shoulder pain ruled out ACS in the ED negative findings on EKG and negative Trops, ruled out PE with CTA Chest. CTA Chest findings of Loculated Moderate pleural effusion, concerning for Pneumonia. on IV abx, pending thoracentesis. 52yo female with no prior hx, presented to the ED with worsening left sided chest pain and shoulder pain ruled out ACS in the ED negative findings on EKG and negative Trops, ruled out PE with CTA Chest. CTA Chest findings of Loculated Moderate pleural effusion, concerning for empyema. on IV abx, pending IR pigtail. I have reviewed and confirmed nurses' notes for patient's medications, allergies, medical history, and surgical history.

## 2018-11-18 NOTE — PROGRESS NOTE ADULT - SUBJECTIVE AND OBJECTIVE BOX
Vascular & Interventional Radiology Pre-Procedure Note    Procedure Name: Left chest tube placement    HPI: 53y Female with left pleural effusion, increased WBC, fever, and tachycardia.    Allergies: latex (Rash)    Medications (Abx/Cardiac/Anticoagulation/Blood Products)  ampicillin/sulbactam  IVPB: 200 mL/Hr IV Intermittent (11-18 @ 13:37)  azithromycin  IVPB: 250 mL/Hr IV Intermittent (11-17 @ 15:36)  cefTRIAXone   IVPB: 100 mL/Hr IV Intermittent (11-17 @ 14:58)    Data:  160.02  63.5  T(C): 38.7  HR: 101  BP: 110/66  RR: 18  SpO2: 93%    -WBC 15.02 / HgB 11.1 / Hct 33.0 / Plt 369  -Na 139 / Cl 106 / BUN 14 / Glucose 125  -K 3.3 / CO2 20 / Cr 0.63  -ALT -- / Alk Phos -- / T.Bili --  -INR1.15    Imaging: CT chest: Left effusion    Plan:   -53y Female presents for left chest tube placement in the setting of a pleural effusion, leukocytosis, tachycardia, and fever.   -Risks/Benefits/alternatives explained with the patient and/or healthcare proxy and witnessed informed consent obtained.

## 2018-11-18 NOTE — PROGRESS NOTE ADULT - SUBJECTIVE AND OBJECTIVE BOX
Patient is a 53y old  Female who presents with a chief complaint of chest pain, left shoulder pain (2018 15:36)      SUBJECTIVE / OVERNIGHT EVENTS:    Patient seen and examined.       Vital Signs Last 24 Hrs  T(C): 37.2 (2018 05:20), Max: 39.1 (2018 14:17)  T(F): 99 (2018 05:20), Max: 102.3 (2018 14:17)  HR: 100 (2018 05:20) (89 - 100)  BP: 104/63 (2018 05:20) (96/60 - 121/74)  BP(mean): --  RR: 18 (2018 05:20) (16 - 20)  SpO2: 95% (2018 05:20) (94% - 96%)  I&O's Summary    2018 07:01  -  2018 07:00  --------------------------------------------------------  IN: 1860 mL / OUT: 0 mL / NET: 1860 mL        PE:  GENERAL: NAD, AAOx3  HEAD:  Atraumatic, Normocephalic  EYES: EOMI, PERRLA, conjunctiva and sclera clear  NECK: Supple, No JVD  CHEST/LUNG: CTABL, No wheeze  HEART: Regular rate and rhythm; + murmur  ABDOMEN: Soft, Nontender, Nondistended; Bowel sounds present  EXTREMITIES:  2+ Peripheral Pulses, No clubbing, cyanosis, or edema  SKIN: No rashes or lesions  NEURO: No focal deficits    LABS:                        14.2   13.9  )-----------( 465      ( 2018 11:47 )             42.5     11-18    139  |  106  |  14  ----------------------------<  125<H>  3.3<L>   |  20<L>  |  0.63    Ca    8.5      2018 07:49    TPro  8.2  /  Alb  4.3  /  TBili  0.4  /  DBili  x   /  AST  11  /  ALT  15  /  AlkPhos  89  11-17    PT/INR - ( 2018 11:47 )   PT: 13.2 sec;   INR: 1.15 ratio         PTT - ( 2018 11:47 )  PTT:33.0 sec  CAPILLARY BLOOD GLUCOSE            Urinalysis Basic - ( 2018 13:57 )    Color: Yellow / Appearance: Turbid / S.021 / pH: x  Gluc: x / Ketone: Negative  / Bili: Negative / Urobili: Negative   Blood: x / Protein: 30 mg/dL / Nitrite: Negative   Leuk Esterase: Negative / RBC: 12 /hpf / WBC 16 /hpf   Sq Epi: x / Non Sq Epi: 17 /hpf / Bacteria: Negative        RADIOLOGY & ADDITIONAL TESTS:    Imaging Personally Reviewed:  [x] YES  [ ] NO    Consultant(s) Notes Reviewed:  [x] YES  [ ] NO    MEDICATIONS  (STANDING):  azithromycin   Tablet 500 milliGRAM(s) Oral at bedtime  cefTRIAXone   IVPB 1 Gram(s) IV Intermittent every 24 hours  influenza   Vaccine 0.5 milliLiter(s) IntraMuscular once  sodium chloride 0.9%. 1000 milliLiter(s) (125 mL/Hr) IV Continuous <Continuous>    MEDICATIONS  (PRN):  acetaminophen   Tablet .. 650 milliGRAM(s) Oral every 6 hours PRN Temp greater or equal to 38C (100.4F), Moderate Pain (4 - 6)      Care Discussed with Consultants/Other Providers [x] YES  [ ] NO    HEALTH ISSUES - PROBLEM Dx:  Thrombocytosis: Thrombocytosis  Suspected pulmonary embolism  Atelectasis: Atelectasis  Loculated pleural effusion: Loculated pleural effusion  Pneumonia of left lower lobe due to infectious organism: Pneumonia of left lower lobe due to infectious organism  Sepsis, due to unspecified organism: Sepsis, due to unspecified organism Patient is a 53y old  Female who presents with a chief complaint of chest pain, left shoulder pain (2018 15:36)      SUBJECTIVE / OVERNIGHT EVENTS:    Patient seen and examined. co pain with inspiration. left chest pain. did have temp 102.3 in ER but now afebrile.      Vital Signs Last 24 Hrs  T(C): 37.2 (2018 05:20), Max: 39.1 (2018 14:17)  T(F): 99 (2018 05:20), Max: 102.3 (2018 14:17)  HR: 100 (2018 05:20) (89 - 100)  BP: 104/63 (2018 05:20) (96/60 - 121/74)  BP(mean): --  RR: 18 (2018 05:20) (16 - 20)  SpO2: 95% (2018 05:20) (94% - 96%)  I&O's Summary    2018 07:01  -  2018 07:00  --------------------------------------------------------  IN: 1860 mL / OUT: 0 mL / NET: 1860 mL        PE:  GENERAL: NAD, AAOx3  HEAD:  Atraumatic, Normocephalic  EYES: EOMI, PERRLA, conjunctiva and sclera clear  NECK: Supple, No JVD  CHEST/LUNG: decrease bs left base, rales right base  HEART: Regular rate and rhythm; no murmur  ABDOMEN: Soft, Nontender, Nondistended; Bowel sounds present  EXTREMITIES:  2+ Peripheral Pulses, No clubbing, cyanosis, or edema  SKIN: No rashes or lesions  NEURO: No focal deficits    LABS:                        14.2   13.9  )-----------( 465      ( 2018 11:47 )             42.5     -    139  |  106  |  14  ----------------------------<  125<H>  3.3<L>   |  20<L>  |  0.63    Ca    8.5      2018 07:49    TPro  8.2  /  Alb  4.3  /  TBili  0.4  /  DBili  x   /  AST  11  /  ALT  15  /  AlkPhos  89  11-17    PT/INR - ( 2018 11:47 )   PT: 13.2 sec;   INR: 1.15 ratio         PTT - ( 2018 11:47 )  PTT:33.0 sec  CAPILLARY BLOOD GLUCOSE            Urinalysis Basic - ( 2018 13:57 )    Color: Yellow / Appearance: Turbid / S.021 / pH: x  Gluc: x / Ketone: Negative  / Bili: Negative / Urobili: Negative   Blood: x / Protein: 30 mg/dL / Nitrite: Negative   Leuk Esterase: Negative / RBC: 12 /hpf / WBC 16 /hpf   Sq Epi: x / Non Sq Epi: 17 /hpf / Bacteria: Negative        RADIOLOGY & ADDITIONAL TESTS:    Imaging Personally Reviewed:  [x] YES  [ ] NO    Consultant(s) Notes Reviewed:  [x] YES  [ ] NO    MEDICATIONS  (STANDING):  azithromycin   Tablet 500 milliGRAM(s) Oral at bedtime  cefTRIAXone   IVPB 1 Gram(s) IV Intermittent every 24 hours  influenza   Vaccine 0.5 milliLiter(s) IntraMuscular once  sodium chloride 0.9%. 1000 milliLiter(s) (125 mL/Hr) IV Continuous <Continuous>    MEDICATIONS  (PRN):  acetaminophen   Tablet .. 650 milliGRAM(s) Oral every 6 hours PRN Temp greater or equal to 38C (100.4F), Moderate Pain (4 - 6)      Care Discussed with Consultants/Other Providers [x] YES  [ ] NO    HEALTH ISSUES - PROBLEM Dx:  Thrombocytosis: Thrombocytosis  Suspected pulmonary embolism  Atelectasis: Atelectasis  Loculated pleural effusion: Loculated pleural effusion  Pneumonia of left lower lobe due to infectious organism: Pneumonia of left lower lobe due to infectious organism  Sepsis, due to unspecified organism: Sepsis, due to unspecified organism

## 2018-11-18 NOTE — PROGRESS NOTE ADULT - PROBLEM SELECTOR PLAN 1
Fever of 102.3 + Leukocytosis with loculated pleural effusion findings on CTA Chest  Continue Ceftriaxone + Azithromycin  follow up with Blood cultures, Urine Legionella Ag  pulm consult   Tylenol prn Fever of 102.3 + Leukocytosis with loculated pleural effusion findings on CTA Chest  Continue unasyn and azithro  follow up with Blood cultures, Urine Legionella Ag  dw pulm, plan for IR pigtail  Tylenol prn

## 2018-11-18 NOTE — CONSULT NOTE ADULT - ATTENDING COMMENTS
persistent collection despite pigtail placement, positive gram stain. plan for vats/decort, chest tube placement.

## 2018-11-18 NOTE — PROGRESS NOTE ADULT - PROBLEM SELECTOR PLAN 3
CTA Chest findings for Loculated Moderate size pleural effusion  This is likely secondary to Pneumonia  see above CTA Chest findings for loculated moderate size pleural effusion  see above

## 2018-11-18 NOTE — CONSULT NOTE ADULT - ASSESSMENT
Febrile illness with probable LLL consolidation c/w lpneumonia and minimally loculated simplle L parapneumonic effusion - r/o empyema. Doubt malignancy or TB.    REC    Plan for IR US guided L pigtail catheter in AM  Continue Azithromycin for now and change ceftriaxone to Unasyn  Check quantiferon   Legionella Ag  Will consult thoracic surgery MON for imcomplete drainage or overt infection

## 2018-11-19 ENCOUNTER — TRANSCRIPTION ENCOUNTER (OUTPATIENT)
Age: 53
End: 2018-11-19

## 2018-11-19 LAB
ANION GAP SERPL CALC-SCNC: 11 MMOL/L — SIGNIFICANT CHANGE UP (ref 5–17)
B PERT IGG+IGM PNL SER: ABNORMAL
BLD GP AB SCN SERPL QL: NEGATIVE — SIGNIFICANT CHANGE UP
BUN SERPL-MCNC: 13 MG/DL — SIGNIFICANT CHANGE UP (ref 7–23)
CALCIUM SERPL-MCNC: 8.5 MG/DL — SIGNIFICANT CHANGE UP (ref 8.4–10.5)
CHLORIDE SERPL-SCNC: 109 MMOL/L — HIGH (ref 96–108)
CO2 SERPL-SCNC: 20 MMOL/L — LOW (ref 22–31)
COLOR FLD: YELLOW — SIGNIFICANT CHANGE UP
CREAT SERPL-MCNC: 0.52 MG/DL — SIGNIFICANT CHANGE UP (ref 0.5–1.3)
FLUID INTAKE SUBSTANCE CLASS: SIGNIFICANT CHANGE UP
FLUID SEGMENTED GRANULOCYTES: 85 % — SIGNIFICANT CHANGE UP
GLUCOSE SERPL-MCNC: 111 MG/DL — HIGH (ref 70–99)
GRAM STN FLD: SIGNIFICANT CHANGE UP
HCG SERPL-ACNC: NEGATIVE MIU/ML — SIGNIFICANT CHANGE UP
HCT VFR BLD CALC: 34.1 % — LOW (ref 34.5–45)
HGB BLD-MCNC: 11.2 G/DL — LOW (ref 11.5–15.5)
LDH SERPL L TO P-CCNC: 2123 U/L — SIGNIFICANT CHANGE UP
LYMPHOCYTES # FLD: 10 % — SIGNIFICANT CHANGE UP
MCHC RBC-ENTMCNC: 29.6 PG — SIGNIFICANT CHANGE UP (ref 27–34)
MCHC RBC-ENTMCNC: 32.8 GM/DL — SIGNIFICANT CHANGE UP (ref 32–36)
MCV RBC AUTO: 90 FL — SIGNIFICANT CHANGE UP (ref 80–100)
MONOS+MACROS # FLD: 5 % — SIGNIFICANT CHANGE UP
MRSA PCR RESULT.: SIGNIFICANT CHANGE UP
NIGHT BLUE STAIN TISS: SIGNIFICANT CHANGE UP
PLATELET # BLD AUTO: 340 K/UL — SIGNIFICANT CHANGE UP (ref 150–400)
POTASSIUM SERPL-MCNC: 3.6 MMOL/L — SIGNIFICANT CHANGE UP (ref 3.5–5.3)
POTASSIUM SERPL-SCNC: 3.6 MMOL/L — SIGNIFICANT CHANGE UP (ref 3.5–5.3)
PROT FLD-MCNC: 4.7 G/DL — SIGNIFICANT CHANGE UP
RBC # BLD: 3.79 M/UL — LOW (ref 3.8–5.2)
RBC # FLD: 13.1 % — SIGNIFICANT CHANGE UP (ref 10.3–14.5)
RCV VOL RI: 5750 /UL — HIGH (ref 0–5)
RH IG SCN BLD-IMP: POSITIVE — SIGNIFICANT CHANGE UP
S AUREUS DNA NOSE QL NAA+PROBE: SIGNIFICANT CHANGE UP
SODIUM SERPL-SCNC: 140 MMOL/L — SIGNIFICANT CHANGE UP (ref 135–145)
SPECIMEN SOURCE: SIGNIFICANT CHANGE UP
SPECIMEN SOURCE: SIGNIFICANT CHANGE UP
TOTAL NUCLEATED CELL COUNT, BODY FLUID: HIGH /UL (ref 0–5)
TUBE TYPE: SIGNIFICANT CHANGE UP
WBC # BLD: 16.25 K/UL — HIGH (ref 3.8–10.5)
WBC # FLD AUTO: 16.25 K/UL — HIGH (ref 3.8–10.5)

## 2018-11-19 PROCEDURE — 71045 X-RAY EXAM CHEST 1 VIEW: CPT | Mod: 26

## 2018-11-19 PROCEDURE — 99231 SBSQ HOSP IP/OBS SF/LOW 25: CPT

## 2018-11-19 RX ORDER — OXYCODONE HYDROCHLORIDE 5 MG/1
5 TABLET ORAL ONCE
Qty: 0 | Refills: 0 | Status: DISCONTINUED | OUTPATIENT
Start: 2018-11-19 | End: 2018-11-19

## 2018-11-19 RX ORDER — OXYCODONE HYDROCHLORIDE 5 MG/1
5 TABLET ORAL EVERY 6 HOURS
Qty: 0 | Refills: 0 | Status: DISCONTINUED | OUTPATIENT
Start: 2018-11-19 | End: 2018-11-20

## 2018-11-19 RX ADMIN — AMPICILLIN SODIUM AND SULBACTAM SODIUM 200 GRAM(S): 250; 125 INJECTION, POWDER, FOR SUSPENSION INTRAMUSCULAR; INTRAVENOUS at 21:00

## 2018-11-19 RX ADMIN — Medication 650 MILLIGRAM(S): at 23:48

## 2018-11-19 RX ADMIN — AMPICILLIN SODIUM AND SULBACTAM SODIUM 200 GRAM(S): 250; 125 INJECTION, POWDER, FOR SUSPENSION INTRAMUSCULAR; INTRAVENOUS at 14:50

## 2018-11-19 RX ADMIN — OXYCODONE HYDROCHLORIDE 5 MILLIGRAM(S): 5 TABLET ORAL at 05:50

## 2018-11-19 RX ADMIN — Medication 650 MILLIGRAM(S): at 01:01

## 2018-11-19 RX ADMIN — Medication 650 MILLIGRAM(S): at 02:55

## 2018-11-19 RX ADMIN — OXYCODONE HYDROCHLORIDE 5 MILLIGRAM(S): 5 TABLET ORAL at 10:52

## 2018-11-19 RX ADMIN — OXYCODONE HYDROCHLORIDE 5 MILLIGRAM(S): 5 TABLET ORAL at 10:22

## 2018-11-19 RX ADMIN — AMPICILLIN SODIUM AND SULBACTAM SODIUM 200 GRAM(S): 250; 125 INJECTION, POWDER, FOR SUSPENSION INTRAMUSCULAR; INTRAVENOUS at 05:51

## 2018-11-19 RX ADMIN — SODIUM CHLORIDE 75 MILLILITER(S): 9 INJECTION INTRAMUSCULAR; INTRAVENOUS; SUBCUTANEOUS at 05:51

## 2018-11-19 RX ADMIN — Medication 650 MILLIGRAM(S): at 22:43

## 2018-11-19 RX ADMIN — OXYCODONE HYDROCHLORIDE 5 MILLIGRAM(S): 5 TABLET ORAL at 06:20

## 2018-11-19 NOTE — PROGRESS NOTE ADULT - SUBJECTIVE AND OBJECTIVE BOX
Patient is a 53y old  Female who presents with a chief complaint of chest pain, left shoulder pain (2018 12:17)      SUBJECTIVE / OVERNIGHT EVENTS:    Patient seen and examined. co left chest pain from pigtail. breathing improving, can take deep breaths.      Vital Signs Last 24 Hrs  T(C): 36.7 (2018 09:46), Max: 38.7 (2018 12:40)  T(F): 98.1 (2018 09:46), Max: 101.7 (2018 12:40)  HR: 93 (2018 09:46) (85 - 101)  BP: 107/70 (2018 09:46) (97/61 - 110/72)  BP(mean): --  RR: 18 (2018 09:46) (18 - 18)  SpO2: 97% (2018 09:46) (93% - 97%)  I&O's Summary    2018 07:  -  2018 07:00  --------------------------------------------------------  IN: 2460 mL / OUT: 1260 mL / NET: 1200 mL    2018 07:01  -  2018 12:26  --------------------------------------------------------  IN: 360 mL / OUT: 0 mL / NET: 360 mL        PE:  GENERAL: NAD, AAOx3  HEAD:  Atraumatic, Normocephalic  EYES: EOMI, PERRLA, conjunctiva and sclera clear  NECK: Supple, No JVD  CHEST/LUNG: CTABL, No wheeze, left pigtail, ttp pigtail site  HEART: Regular rate and rhythm; no murmur  ABDOMEN: Soft, Nontender, Nondistended; Bowel sounds present  EXTREMITIES:  2+ Peripheral Pulses, No clubbing, cyanosis, or edema  SKIN: No rashes or lesions  NEURO: No focal deficits    LABS:                        11.2   16.25 )-----------( 340      ( 2018 09:05 )             34.1     11-19    140  |  109<H>  |  13  ----------------------------<  111<H>  3.6   |  20<L>  |  0.52    Ca    8.5      2018 07:26        CAPILLARY BLOOD GLUCOSE            Urinalysis Basic - ( 2018 13:57 )    Color: Yellow / Appearance: Turbid / S.021 / pH: x  Gluc: x / Ketone: Negative  / Bili: Negative / Urobili: Negative   Blood: x / Protein: 30 mg/dL / Nitrite: Negative   Leuk Esterase: Negative / RBC: 12 /hpf / WBC 16 /hpf   Sq Epi: x / Non Sq Epi: 17 /hpf / Bacteria: Negative        RADIOLOGY & ADDITIONAL TESTS:    Imaging Personally Reviewed:  [x] YES  [ ] NO    Consultant(s) Notes Reviewed:  [x] YES  [ ] NO    MEDICATIONS  (STANDING):  ampicillin/sulbactam  IVPB 3 Gram(s) IV Intermittent every 6 hours  ampicillin/sulbactam  IVPB      influenza   Vaccine 0.5 milliLiter(s) IntraMuscular once  sodium chloride 0.9%. 1000 milliLiter(s) (75 mL/Hr) IV Continuous <Continuous>    MEDICATIONS  (PRN):  acetaminophen   Tablet .. 650 milliGRAM(s) Oral every 6 hours PRN Temp greater or equal to 38C (100.4F), Moderate Pain (4 - 6)  oxyCODONE    IR 5 milliGRAM(s) Oral every 6 hours PRN Severe Pain (7 - 10)      Care Discussed with Consultants/Other Providers [x] YES  [ ] NO    HEALTH ISSUES - PROBLEM Dx:  Thrombocytosis: Thrombocytosis  Suspected pulmonary embolism  Atelectasis: Atelectasis  Loculated pleural effusion: Loculated pleural effusion  Pneumonia of left lower lobe due to infectious organism: Pneumonia of left lower lobe due to infectious organism  Sepsis, due to unspecified organism: Sepsis, due to unspecified organism

## 2018-11-19 NOTE — PROGRESS NOTE ADULT - PROBLEM SELECTOR PLAN 1
Fever of 102.3 + Leukocytosis with loculated pleural effusion findings on CTA Chest  Continue unasyn and azithro  follow up with Blood cultures, Urine Legionella Ag  dw pulm, plan for IR pigtail  Tylenol prn empyema  Continue unasyn  follow up with Blood cultures, Urine Legionella Ag  sp IR pigtail, empyema  fu imaging  CTS consult for VATS  Tylenol prn empyema  Continue unasyn  follow up with Blood cultures, Urine Legionella Ag  sp IR pigtail, empyema  fu imaging  CTS consult for VATS  Tylenol prn, oxycodone for severe pain

## 2018-11-19 NOTE — PROGRESS NOTE ADULT - SUBJECTIVE AND OBJECTIVE BOX
Subjective: "I feel ok, a little better since the drain" Denies CP or SOB. No recent sickness.     Vital Signs:  Vital Signs Last 24 Hrs  T(C): 36.7 (18 @ 09:46), Max: 38.7 (18 @ 12:40)  T(F): 98.1 (18 @ 09:46), Max: 101.7 (18 @ 12:40)  HR: 93 (18 @ 09:46) (85 - 101)  BP: 107/70 (18 @ 09:46) (97/61 - 110/72)  RR: 18 (18 @ 09:46) (18 - 18)  SpO2: 97% (18 @ 09:46) (93% - 97%) on (O2)    Telemetry/Alarms:  General: WN/WD NAD  Neurology: Awake, nonfocal, BARROS x 4  Eyes: Scleras clear, PERRLA/ EOMI, Gross vision intact  ENT:Gross hearing intact, grossly patent pharynx, no stridor  Neck: Neck supple, trachea midline, No JVD,   Respiratory: decreased Left mid to base  CV: RRR, S1S2, no murmurs, rubs or gallops  Abdominal: Soft, NT, ND +BS,   Extremities: No edema, + peripheral pulses  Skin: No Rashes, Hematoma, Ecchymosis  Lymphatic: No Neck, axilla, groin LAD  Psych: Oriented x 3, normal affect    Tubes: Left IR placed PTC to suction, 500ml drained, purulent now serous fluid.   Relevant labs, radiology and Medications reviewed           CXR  after catheter placement still showing retained moderate effusion.              11.2   16.25 )-----------( 340      ( 2018 09:05 )             34.1     -    140  |  109<H>  |  13  ----------------------------<  111<H>  3.6   |  20<L>  |  0.52    Ca    8.5      2018 07:26        MEDICATIONS  (STANDING):  ampicillin/sulbactam  IVPB 3 Gram(s) IV Intermittent every 6 hours  ampicillin/sulbactam  IVPB      influenza   Vaccine 0.5 milliLiter(s) IntraMuscular once  sodium chloride 0.9%. 1000 milliLiter(s) (75 mL/Hr) IV Continuous <Continuous>    MEDICATIONS  (PRN):  acetaminophen   Tablet .. 650 milliGRAM(s) Oral every 6 hours PRN Temp greater or equal to 38C (100.4F), Moderate Pain (4 - 6)  oxyCODONE    IR 5 milliGRAM(s) Oral every 6 hours PRN Severe Pain (7 - 10)    Pertinent Physical Exam  I&O's Summary    2018 07:  -  2018 07:00  --------------------------------------------------------  IN: 2460 mL / OUT: 1260 mL / NET: 1200 mL    2018 07:  -  2018 12:26  --------------------------------------------------------  IN: 360 mL / OUT: 0 mL / NET: 360 mL    Social: , lives w   No smoking. No ETOH.     Assessment  53y Female  w/ PAST MEDICAL & SURGICAL HISTORY:  Herpes zoster  Uterine prolapse  Thyroid nodule  Dyslipidemia  Acoustic neuroma  S/P partial hysterectomy  S/P brain surgery: acoutic neuromia  S/P  section:   admitted with complaints of Patient is a 53y old  Female who presents with a chief complaint of chest pain, left shoulder pain (2018   FOund to have left pleural effusion. S/p IR placement Of left PTC on  now with retained effusion.     PLAN  -Cont PTC to suction  _FU Cultures, cont Antibx  -FU CXR from today  -As per Dr. Ward, Pulmonary attending- will proceed with Left VATs/Decortication tomorrow   Booked w OR. NPO after 12mn, obtain ordered labs  Above d/w pt at length.   Will follow.

## 2018-11-19 NOTE — PROGRESS NOTE ADULT - SUBJECTIVE AND OBJECTIVE BOX
Interventional Radiology Follow- Up Note      53y Female with loculated pleural effusion  and SIRS s/p drainage on 11/18 in Interventional Radiology with Dr. Kevin. Reports improvement in breathing.     Vitals: T(F): 97.7 (11-19-18 @ 05:49), Max: 101.7 (11-18-18 @ 12:40)  HR: 85 (11-19-18 @ 05:49) (85 - 101)  BP: 110/72 (11-19-18 @ 05:49) (97/61 - 110/72)  RR: 18 (11-19-18 @ 05:49) (18 - 18)  SpO2: 95% (11-19-18 @ 05:49) (93% - 97%)  Wt(kg): --    LABS:                        11.2   16.25 )-----------( 340      ( 19 Nov 2018 09:05 )             34.1     11-19    140  |  109<H>  |  13  ----------------------------<  111<H>  3.6   |  20<L>  |  0.52    Ca    8.5      19 Nov 2018 07:26    TPro  8.2  /  Alb  4.3  /  TBili  0.4  /  DBili  x   /  AST  11  /  ALT  15  /  AlkPhos  89  11-17    PT/INR - ( 17 Nov 2018 11:47 )   PT: 13.2 sec;   INR: 1.15 ratio         PTT - ( 17 Nov 2018 11:47 )  PTT:33.0 sec      Culture - Body Fluid with Gram Stain (collected 11-18-18 @ 22:09)  Source: .Body Fluid Pleural Fluid  Gram Stain (11-19-18 @ 03:01):    polymorphonuclear leukocytes seen per low power field    Gram positive cocci in pairs seen per oil power field    by cytocentrifuge      PHYSICAL EXAM:  General: Nontoxic, in NAD  Neuro:  Alert & oriented x 3  posterior chest drain site dressing is c/d/i. 10.5 Urdu drain noted. Pleura vac on water seal. Drained 170cc of serous fluid.     Impression: 53y Female with left loculated pleural effusion s/p drainage.   Plan:  - PACS down at this time, will follow up on the chest x ray once PACS is working.   -continue to monitor out put    -trend vitals, labs  - plan per thoracic surgery and pulmonology.   -will discuss with IR attending     Please call IR at extension [phone] or [phone] with any questions, concerns, or issues regarding above.

## 2018-11-19 NOTE — PROGRESS NOTE ADULT - ASSESSMENT
LLL pneumonia with empyema  F/U CXR pending today  Thoracic surgery consulted for probable VATS on 11/20  Continue unasyn pending cultures  MRSA PCA ordered

## 2018-11-19 NOTE — PROGRESS NOTE ADULT - SUBJECTIVE AND OBJECTIVE BOX
Follow-up Pulm Progress Note  Perez Albert MD  [phone]    S/P L pigtail catheter US guided on 11/18  Initial 500 ml+ cloudy fluid drained, with addtional 160 overnight  Gram stain positive for gram + cocci, cultures and cell count pending  Still c/o L sided CP and remains febrile       Medications:  Vital Signs Last 24 Hrs  T(C): 36.7 (19 Nov 2018 09:46), Max: 38.7 (18 Nov 2018 12:40)  T(F): 98.1 (19 Nov 2018 09:46), Max: 101.7 (18 Nov 2018 12:40)  HR: 93 (19 Nov 2018 09:46) (85 - 101)  BP: 107/70 (19 Nov 2018 09:46) (97/61 - 110/72)  BP(mean): --  RR: 18 (19 Nov 2018 09:46) (18 - 18)  SpO2: 97% (19 Nov 2018 09:46) (93% - 97%)      11-18 @ 07:01  -  11-19 @ 07:00  --------------------------------------------------------  IN: 2460 mL / OUT: 1260 mL / NET: 1200 mL        LABS:                        11.2   16.25 )-----------( 340      ( 19 Nov 2018 09:05 )             34.1     11-19    140  |  109<H>  |  13  ----------------------------<  111<H>  3.6   |  20<L>  |  0.52    Ca    8.5      19 Nov 2018 07:26    TPro  8.2  /  Alb  4.3  /  TBili  0.4  /  DBili  x   /  AST  11  /  ALT  15  /  AlkPhos  89  11-17      PT/INR - ( 17 Nov 2018 11:47 )   PT: 13.2 sec;   INR: 1.15 ratio         PTT - ( 17 Nov 2018 11:47 )  PTT:33.0 sec        CULTURES:  Culture Results:   No growth to date. (11-17 @ 21:39)  Culture Results:   No growth to date. (11-17 @ 21:39)    Most recent blood culture -- 11-18 @ 22:09   -- -- .Body Fluid Pleural Fluid 11-18 @ 22:09  Most recent blood culture -- 11-17 @ 21:39   -- -- .Blood Blood 11-17 @ 21:39      Physical Examination:  PULM:   CVS: Regular rate and rhythm, no murmurs, rubs, or gallops  ABD: Soft, non-tender  EXT:  No clubbing, cyanosis, or edema    RADIOLOGY REVIEWED  CXR:    CT chest:    TTE:

## 2018-11-19 NOTE — PROGRESS NOTE ADULT - ASSESSMENT
52yo female with no prior hx, presented to the ED with worsening left sided chest pain and shoulder pain ruled out ACS in the ED negative findings on EKG and negative Trops, ruled out PE with CTA Chest. CTA Chest findings of Loculated Moderate pleural effusion, concerning for empyema. on IV abx, pending IR pigtail. 52yo female with no prior hx, presented to the ED with worsening left sided chest pain and shoulder pain ruled out ACS in the ED negative findings on EKG and negative Trops, ruled out PE with CTA Chest. CTA Chest findings of Loculated Moderate pleural effusion, concerning for empyema. on IV abx, sp pigtail.

## 2018-11-20 ENCOUNTER — RESULT REVIEW (OUTPATIENT)
Age: 53
End: 2018-11-20

## 2018-11-20 LAB
ANION GAP SERPL CALC-SCNC: 12 MMOL/L — SIGNIFICANT CHANGE UP (ref 5–17)
APPEARANCE UR: CLEAR — SIGNIFICANT CHANGE UP
APTT BLD: 31.2 SEC — SIGNIFICANT CHANGE UP (ref 27.5–36.3)
BILIRUB UR-MCNC: NEGATIVE — SIGNIFICANT CHANGE UP
BUN SERPL-MCNC: 8 MG/DL — SIGNIFICANT CHANGE UP (ref 7–23)
CALCIUM SERPL-MCNC: 8.3 MG/DL — LOW (ref 8.4–10.5)
CHLORIDE SERPL-SCNC: 109 MMOL/L — HIGH (ref 96–108)
CO2 SERPL-SCNC: 19 MMOL/L — LOW (ref 22–31)
COLOR SPEC: SIGNIFICANT CHANGE UP
CREAT SERPL-MCNC: 0.45 MG/DL — LOW (ref 0.5–1.3)
DIFF PNL FLD: NEGATIVE — SIGNIFICANT CHANGE UP
GLUCOSE SERPL-MCNC: 115 MG/DL — HIGH (ref 70–99)
GLUCOSE UR QL: NEGATIVE — SIGNIFICANT CHANGE UP
HCT VFR BLD CALC: 33.6 % — LOW (ref 34.5–45)
HGB BLD-MCNC: 11.1 G/DL — LOW (ref 11.5–15.5)
INR BLD: 1.23 RATIO — HIGH (ref 0.88–1.16)
KETONES UR-MCNC: ABNORMAL
LEUKOCYTE ESTERASE UR-ACNC: NEGATIVE — SIGNIFICANT CHANGE UP
MCHC RBC-ENTMCNC: 29.6 PG — SIGNIFICANT CHANGE UP (ref 27–34)
MCHC RBC-ENTMCNC: 33 GM/DL — SIGNIFICANT CHANGE UP (ref 32–36)
MCV RBC AUTO: 89.6 FL — SIGNIFICANT CHANGE UP (ref 80–100)
NITRITE UR-MCNC: NEGATIVE — SIGNIFICANT CHANGE UP
PH UR: 6 — SIGNIFICANT CHANGE UP (ref 5–8)
PLATELET # BLD AUTO: 348 K/UL — SIGNIFICANT CHANGE UP (ref 150–400)
POTASSIUM SERPL-MCNC: 3.7 MMOL/L — SIGNIFICANT CHANGE UP (ref 3.5–5.3)
POTASSIUM SERPL-SCNC: 3.7 MMOL/L — SIGNIFICANT CHANGE UP (ref 3.5–5.3)
PROT UR-MCNC: SIGNIFICANT CHANGE UP
PROTHROM AB SERPL-ACNC: 14.1 SEC — HIGH (ref 10–13.1)
RBC # BLD: 3.75 M/UL — LOW (ref 3.8–5.2)
RBC # FLD: 13.1 % — SIGNIFICANT CHANGE UP (ref 10.3–14.5)
SODIUM SERPL-SCNC: 140 MMOL/L — SIGNIFICANT CHANGE UP (ref 135–145)
SP GR SPEC: 1.02 — SIGNIFICANT CHANGE UP (ref 1.01–1.02)
UROBILINOGEN FLD QL: NEGATIVE — SIGNIFICANT CHANGE UP
WBC # BLD: 14.94 K/UL — HIGH (ref 3.8–10.5)
WBC # FLD AUTO: 14.94 K/UL — HIGH (ref 3.8–10.5)

## 2018-11-20 PROCEDURE — 88112 CYTOPATH CELL ENHANCE TECH: CPT | Mod: 26

## 2018-11-20 PROCEDURE — 32652 THORACOSCOPY REM TOTL CORTEX: CPT

## 2018-11-20 PROCEDURE — 88305 TISSUE EXAM BY PATHOLOGIST: CPT | Mod: 26

## 2018-11-20 PROCEDURE — 71045 X-RAY EXAM CHEST 1 VIEW: CPT | Mod: 26

## 2018-11-20 RX ORDER — SODIUM CHLORIDE 9 MG/ML
1000 INJECTION, SOLUTION INTRAVENOUS
Qty: 0 | Refills: 0 | Status: DISCONTINUED | OUTPATIENT
Start: 2018-11-20 | End: 2018-11-22

## 2018-11-20 RX ORDER — HYDROMORPHONE HYDROCHLORIDE 2 MG/ML
30 INJECTION INTRAMUSCULAR; INTRAVENOUS; SUBCUTANEOUS
Qty: 0 | Refills: 0 | Status: DISCONTINUED | OUTPATIENT
Start: 2018-11-20 | End: 2018-11-22

## 2018-11-20 RX ORDER — ONDANSETRON 8 MG/1
4 TABLET, FILM COATED ORAL EVERY 6 HOURS
Qty: 0 | Refills: 0 | Status: DISCONTINUED | OUTPATIENT
Start: 2018-11-20 | End: 2018-11-22

## 2018-11-20 RX ORDER — ONDANSETRON 8 MG/1
4 TABLET, FILM COATED ORAL ONCE
Qty: 0 | Refills: 0 | Status: DISCONTINUED | OUTPATIENT
Start: 2018-11-20 | End: 2018-11-20

## 2018-11-20 RX ORDER — HYDROMORPHONE HYDROCHLORIDE 2 MG/ML
0.5 INJECTION INTRAMUSCULAR; INTRAVENOUS; SUBCUTANEOUS
Qty: 0 | Refills: 0 | Status: DISCONTINUED | OUTPATIENT
Start: 2018-11-20 | End: 2018-11-22

## 2018-11-20 RX ORDER — NALOXONE HYDROCHLORIDE 4 MG/.1ML
0.1 SPRAY NASAL
Qty: 0 | Refills: 0 | Status: DISCONTINUED | OUTPATIENT
Start: 2018-11-20 | End: 2018-11-22

## 2018-11-20 RX ORDER — HYDROMORPHONE HYDROCHLORIDE 2 MG/ML
0.5 INJECTION INTRAMUSCULAR; INTRAVENOUS; SUBCUTANEOUS
Qty: 0 | Refills: 0 | Status: DISCONTINUED | OUTPATIENT
Start: 2018-11-20 | End: 2018-11-20

## 2018-11-20 RX ADMIN — AMPICILLIN SODIUM AND SULBACTAM SODIUM 200 GRAM(S): 250; 125 INJECTION, POWDER, FOR SUSPENSION INTRAMUSCULAR; INTRAVENOUS at 02:23

## 2018-11-20 RX ADMIN — AMPICILLIN SODIUM AND SULBACTAM SODIUM 200 GRAM(S): 250; 125 INJECTION, POWDER, FOR SUSPENSION INTRAMUSCULAR; INTRAVENOUS at 07:33

## 2018-11-20 RX ADMIN — OXYCODONE HYDROCHLORIDE 5 MILLIGRAM(S): 5 TABLET ORAL at 00:18

## 2018-11-20 RX ADMIN — HYDROMORPHONE HYDROCHLORIDE 30 MILLILITER(S): 2 INJECTION INTRAMUSCULAR; INTRAVENOUS; SUBCUTANEOUS at 17:50

## 2018-11-20 RX ADMIN — OXYCODONE HYDROCHLORIDE 5 MILLIGRAM(S): 5 TABLET ORAL at 01:00

## 2018-11-20 RX ADMIN — AMPICILLIN SODIUM AND SULBACTAM SODIUM 200 GRAM(S): 250; 125 INJECTION, POWDER, FOR SUSPENSION INTRAMUSCULAR; INTRAVENOUS at 23:27

## 2018-11-20 RX ADMIN — AMPICILLIN SODIUM AND SULBACTAM SODIUM 200 GRAM(S): 250; 125 INJECTION, POWDER, FOR SUSPENSION INTRAMUSCULAR; INTRAVENOUS at 17:59

## 2018-11-20 RX ADMIN — SODIUM CHLORIDE 75 MILLILITER(S): 9 INJECTION, SOLUTION INTRAVENOUS at 17:22

## 2018-11-20 NOTE — PROGRESS NOTE ADULT - SUBJECTIVE AND OBJECTIVE BOX
Follow-up Pulm Progress Note  Perez Albert MD  [phone]    Temp 100.5 on unasy  For VATS today      CULTURES:  Culture Results:   No growth to date. (11-17 @ 21:39)  Culture Results:   No growth to date. (11-17 @ 21:39)    Most recent blood culture -- 11-18 @ 22:09   -- -- .Body Fluid Pleural Fluid 11-18 @ 22:09  Most recent blood culture -- 11-17 @ 21:39   -- -- .Blood Blood 11-17 @ 21:39      Physical Examination:  PULM:   CVS: Regular rate and rhythm, no murmurs, rubs, or gallops  ABD: Soft, non-tender  EXT:  No clubbing, cyanosis, or edema    RADIOLOGY REVIEWED  CXR:    CT chest:    TTE:

## 2018-11-20 NOTE — PROGRESS NOTE ADULT - PROBLEM SELECTOR PLAN 1
empyema  Continue unasyn  ID consult  follow up with Blood cultures, Urine Legionella Ag  sp IR pigtail, empyema, pleural cx growing gp cocci in pairs  CTS  for VATS, NPO  Tylenol prn, oxycodone for severe pain

## 2018-11-20 NOTE — BRIEF OPERATIVE NOTE - PROCEDURE
<<-----Click on this checkbox to enter Procedure VATS, with chest tube insertion for drainage of pleural effusion  11/20/2018    Active  [id number]

## 2018-11-20 NOTE — PRE-ANESTHESIA EVALUATION ADULT - NSANTHOSAYNRD_GEN_A_CORE
No. TAL screening performed.  STOP BANG Legend: 0-2 = LOW Risk; 3-4 = INTERMEDIATE Risk; 5-8 = HIGH Risk

## 2018-11-20 NOTE — PROGRESS NOTE ADULT - SUBJECTIVE AND OBJECTIVE BOX
Patient is a 53y old  Female who presents with a chief complaint of chest pain, left shoulder pain (2018 12:26)      SUBJECTIVE / OVERNIGHT EVENTS:    Patient seen and examined. denies cp sob. febrile overnight. feels warm. NPO for VATS.      Vital Signs Last 24 Hrs  T(C): 37.8 (2018 06:04), Max: 38.1 (2018 22:59)  T(F): 100.1 (2018 06:04), Max: 100.5 (2018 22:59)  HR: 93 (2018 06:04) (93 - 96)  BP: 114/72 (2018 06:04) (101/63 - 114/72)  BP(mean): --  RR: 18 (2018 06:04) (18 - 18)  SpO2: 93% (2018 06:04) (93% - 95%)  I&O's Summary    2018 07:01  -  2018 07:00  --------------------------------------------------------  IN: 1710 mL / OUT: 10 mL / NET: 1700 mL        PE:  GENERAL: NAD, AAOx3  HEAD:  Atraumatic, Normocephalic  EYES: EOMI, PERRLA, conjunctiva and sclera clear  NECK: Supple, No JVD  CHEST/LUNG: CTABL, No wheeze, left pigtail  HEART: Regular rate and rhythm; no murmur  ABDOMEN: Soft, Nontender, Nondistended; Bowel sounds present  EXTREMITIES:  2+ Peripheral Pulses, No clubbing, cyanosis, or edema  SKIN: No rashes or lesions  NEURO: No focal deficits      LABS:                        11.1   14.94 )-----------( 348      ( 2018 07:53 )             33.6     11-20    140  |  109<H>  |  8   ----------------------------<  115<H>  3.7   |  19<L>  |  0.45<L>    Ca    8.3<L>      2018 07:07      PT/INR - ( 2018 09:05 )   PT: 14.1 sec;   INR: 1.23 ratio         PTT - ( 2018 09:05 )  PTT:31.2 sec  CAPILLARY BLOOD GLUCOSE            Urinalysis Basic - ( 2018 00:23 )    Color: Light Yellow / Appearance: Clear / S.017 / pH: x  Gluc: x / Ketone: Small  / Bili: Negative / Urobili: Negative   Blood: x / Protein: Trace / Nitrite: Negative   Leuk Esterase: Negative / RBC: x / WBC x   Sq Epi: x / Non Sq Epi: x / Bacteria: x        RADIOLOGY & ADDITIONAL TESTS:    Imaging Personally Reviewed:  [x] YES  [ ] NO    Consultant(s) Notes Reviewed:  [x] YES  [ ] NO    MEDICATIONS  (STANDING):  ampicillin/sulbactam  IVPB 3 Gram(s) IV Intermittent every 6 hours  ampicillin/sulbactam  IVPB      influenza   Vaccine 0.5 milliLiter(s) IntraMuscular once  sodium chloride 0.9%. 1000 milliLiter(s) (75 mL/Hr) IV Continuous <Continuous>    MEDICATIONS  (PRN):  acetaminophen   Tablet .. 650 milliGRAM(s) Oral every 6 hours PRN Temp greater or equal to 38C (100.4F), Moderate Pain (4 - 6)  oxyCODONE    IR 5 milliGRAM(s) Oral every 6 hours PRN Severe Pain (7 - 10)      Care Discussed with Consultants/Other Providers [x] YES  [ ] NO    HEALTH ISSUES - PROBLEM Dx:  Thrombocytosis: Thrombocytosis  Suspected pulmonary embolism  Atelectasis: Atelectasis  Loculated pleural effusion: Loculated pleural effusion  Pneumonia of left lower lobe due to infectious organism: Pneumonia of left lower lobe due to infectious organism  Sepsis, due to unspecified organism: Sepsis, due to unspecified organism

## 2018-11-20 NOTE — PROGRESS NOTE ADULT - ASSESSMENT
52yo female with no prior hx, presented to the ED with worsening left sided chest pain and shoulder pain ruled out ACS in the ED negative findings on EKG and negative Trops, ruled out PE with CTA Chest. CTA Chest findings of Loculated Moderate pleural effusion, concerning for empyema. on IV abx, sp pigtail. NPO for VATS.

## 2018-11-21 LAB
ANION GAP SERPL CALC-SCNC: 11 MMOL/L — SIGNIFICANT CHANGE UP (ref 5–17)
BASOPHILS # BLD AUTO: 0.02 K/UL — SIGNIFICANT CHANGE UP (ref 0–0.2)
BASOPHILS NFR BLD AUTO: 0.1 % — SIGNIFICANT CHANGE UP (ref 0–2)
BUN SERPL-MCNC: 11 MG/DL — SIGNIFICANT CHANGE UP (ref 7–23)
CALCIUM SERPL-MCNC: 8.4 MG/DL — SIGNIFICANT CHANGE UP (ref 8.4–10.5)
CHLORIDE SERPL-SCNC: 106 MMOL/L — SIGNIFICANT CHANGE UP (ref 96–108)
CO2 SERPL-SCNC: 22 MMOL/L — SIGNIFICANT CHANGE UP (ref 22–31)
CREAT SERPL-MCNC: 0.51 MG/DL — SIGNIFICANT CHANGE UP (ref 0.5–1.3)
CULTURE RESULTS: SIGNIFICANT CHANGE UP
EOSINOPHIL # BLD AUTO: 0 K/UL — SIGNIFICANT CHANGE UP (ref 0–0.5)
EOSINOPHIL NFR BLD AUTO: 0 % — SIGNIFICANT CHANGE UP (ref 0–6)
GLUCOSE SERPL-MCNC: 120 MG/DL — HIGH (ref 70–99)
GRAM STN FLD: SIGNIFICANT CHANGE UP
HCT VFR BLD CALC: 33.4 % — LOW (ref 34.5–45)
HGB BLD-MCNC: 11.1 G/DL — LOW (ref 11.5–15.5)
IMM GRANULOCYTES NFR BLD AUTO: 0.3 % — SIGNIFICANT CHANGE UP (ref 0–1.5)
LYMPHOCYTES # BLD AUTO: 0.83 K/UL — LOW (ref 1–3.3)
LYMPHOCYTES # BLD AUTO: 5.1 % — LOW (ref 13–44)
MCHC RBC-ENTMCNC: 29.7 PG — SIGNIFICANT CHANGE UP (ref 27–34)
MCHC RBC-ENTMCNC: 33.2 GM/DL — SIGNIFICANT CHANGE UP (ref 32–36)
MCV RBC AUTO: 89.3 FL — SIGNIFICANT CHANGE UP (ref 80–100)
MONOCYTES # BLD AUTO: 0.98 K/UL — HIGH (ref 0–0.9)
MONOCYTES NFR BLD AUTO: 6 % — SIGNIFICANT CHANGE UP (ref 2–14)
NEUTROPHILS # BLD AUTO: 14.43 K/UL — HIGH (ref 1.8–7.4)
NEUTROPHILS NFR BLD AUTO: 88.5 % — HIGH (ref 43–77)
NIGHT BLUE STAIN TISS: SIGNIFICANT CHANGE UP
PLATELET # BLD AUTO: 388 K/UL — SIGNIFICANT CHANGE UP (ref 150–400)
POTASSIUM SERPL-MCNC: 4.3 MMOL/L — SIGNIFICANT CHANGE UP (ref 3.5–5.3)
POTASSIUM SERPL-SCNC: 4.3 MMOL/L — SIGNIFICANT CHANGE UP (ref 3.5–5.3)
RBC # BLD: 3.74 M/UL — LOW (ref 3.8–5.2)
RBC # FLD: 13.1 % — SIGNIFICANT CHANGE UP (ref 10.3–14.5)
SODIUM SERPL-SCNC: 139 MMOL/L — SIGNIFICANT CHANGE UP (ref 135–145)
SPECIMEN SOURCE: SIGNIFICANT CHANGE UP
WBC # BLD: 16.31 K/UL — HIGH (ref 3.8–10.5)
WBC # FLD AUTO: 16.31 K/UL — HIGH (ref 3.8–10.5)

## 2018-11-21 PROCEDURE — 71045 X-RAY EXAM CHEST 1 VIEW: CPT | Mod: 26

## 2018-11-21 RX ADMIN — AMPICILLIN SODIUM AND SULBACTAM SODIUM 200 GRAM(S): 250; 125 INJECTION, POWDER, FOR SUSPENSION INTRAMUSCULAR; INTRAVENOUS at 17:31

## 2018-11-21 RX ADMIN — AMPICILLIN SODIUM AND SULBACTAM SODIUM 200 GRAM(S): 250; 125 INJECTION, POWDER, FOR SUSPENSION INTRAMUSCULAR; INTRAVENOUS at 23:29

## 2018-11-21 RX ADMIN — AMPICILLIN SODIUM AND SULBACTAM SODIUM 200 GRAM(S): 250; 125 INJECTION, POWDER, FOR SUSPENSION INTRAMUSCULAR; INTRAVENOUS at 05:54

## 2018-11-21 RX ADMIN — AMPICILLIN SODIUM AND SULBACTAM SODIUM 200 GRAM(S): 250; 125 INJECTION, POWDER, FOR SUSPENSION INTRAMUSCULAR; INTRAVENOUS at 13:48

## 2018-11-21 NOTE — PROGRESS NOTE ADULT - ASSESSMENT
54yo female with no prior hx, presented to the ED with worsening left sided chest pain and shoulder pain ruled out ACS in the ED negative findings on EKG and negative Trops, ruled out PE with CTA Chest. CTA Chest findings of Loculated Moderate pleural effusion, concerning for empyema. on IV abx, sp pigtail. NPO for VATS.

## 2018-11-21 NOTE — CONSULT NOTE ADULT - SUBJECTIVE AND OBJECTIVE BOX
[facility], Division of [facility]ious Diseases  KODI Dang A. Lee  [phone]    PAULY GARCÍA  53y, Female  [id number]      HPI:  52yo female with no prior hx, presented to the ED with worsening left sided chest pain. Pt states earlier this week she developed left shoulder pain radiating to her left side of her chest and her back. She initially assumed cause of her pain was due to musculoskeletal etiology, ie frozen shoulder, from sleeping in a wrong position.    Day of admission, pt noted sudden onset sharp pain on the left side of her chest, radiating to her back associated with pleuritic chest pain and thought she was having a heart attack.  She denied sob, dizziness, blurry vision, N/V, palpitations. Pt also denies symptoms of fever or chills throughout this week. No cough, no sick contacts, no recent antibx  She states she took 2 baby ASA. She denies any relieving and aggravating factors.   found to have left pleural effusion s/p vats and chest tube.    PMH/PSH--  Herpes zoster  Uterine prolapse  Thyroid nodule  Dyslipidemia  Acoustic neuroma  S/P partial hysterectomy  S/P brain surgery  S/P  section      Allergies--latex      Medications--  Antibiotics: ampicillin/sulbactam  IVPB 3 Gram(s) IV Intermittent every 6 hours  ampicillin/sulbactam  IVPB        Immunologic: influenza   Vaccine 0.5 milliLiter(s) IntraMuscular once    Other: HYDROmorphone PCA (1 mG/mL)  HYDROmorphone PCA (1 mG/mL) Rescue Clinician Bolus PRN  lactated ringers.  naloxone Injectable PRN  ondansetron Injectable PRN      Social History--  EtOH: denies ***  Tobacco: denies ***  Drug Use: denies ***    Family/Marital History--      Remainder not relevant to clinical concern.    Travel/Environmental/Occupational History:      Review of Systems:  A >=10-point review of systems was obtained.     Pertinent positives and negatives--  Constitutional: No fevers. No Chills. No Rigors.   Eyes: no blurry vision  ENMT: no sore throat  Cardiovascular: No chest pain. No palpitations.  Respiratory: No shortness of breath. No cough.  Gastrointestinal: No nausea or vomiting. No diarrhea or constipation.   Genitourinary: no dysuria  Musculoskeletal: no myalgia  Skin: no rash  Neurologic: no headache  Psychiatric: no anxiety    Review of systems otherwise negative except as previously noted.    Physical Exam--  Vital Signs: T(F): 97.5 (18 @ 08:22), Max: 102 (18 @ 11:51)  HR: 80 (18 @ 08:22)  BP: 92/58 (18 @ 08:22)  RR: 18 (18 @ 08:22)  SpO2: 95% (18 @ 08:22)  Wt(kg): --  General: Nontoxic-appearing Female in no acute distress.  HEENT: AT/NC.  Anicteric. Conjunctiva pink and moist. Oropharynx clear. Dentition fair.  Neck: Not rigid. No sense of mass.  Nodes: None palpable.  Lungs: Clear bilaterally , left base crackles left chest tube  Heart: Regular rate and rhythm. No Murmur. No rub. No gallop. No palpable thrill.  Abdomen: Bowel sounds present and normoactive. Soft. Nondistended. Nontender.   Back: No spinal tenderness. No costovertebral angle tenderness.   Extremities: No cyanosis or clubbing. + edema.   Skin: Warm. Dry. Good turgor. No rash. No vasculitic stigmata.  Psychiatric: Appropriate affect and mood for situation.         Laboratory & Imaging Data--  CBC                        11.1   16.31 )-----------( 388      ( 2018 08:16 )             33.4       Chemistries      139  |  106  |  11  ----------------------------<  120<H>  4.3   |  22  |  0.51    Ca    8.4      2018 06:56        Culture Data    Culture - Tissue with Gram Stain (collected 2018 00:32)  Source: .Tissue Other, #2 left lower lobe  Gram Stain (2018 03:39):    Few polymorphonuclear leukocytes seen per low power field    No organisms seen    Culture - Fungal, Tissue (collected 2018 00:32)  Source: .Tissue #2 left lower lobe  Preliminary Report (2018 10:32):    Testing in progress    Culture - Tissue with Gram Stain (collected 2018 00:32)  Source: .Tissue Other, #3 left lower lobe  Gram Stain (2018 06:17):    Few polymorphonuclear leukocytes seen per low power field    No organisms seen    Culture - Fungal, Tissue (collected 2018 00:32)  Source: .Tissue #3 left lower lobe  Preliminary Report (2018 10:32):    Testing in progress    Culture - Body Fluid with Gram Stain (collected 2018 00:31)  Source: .Body Fluid Pleural Fluid  Gram Stain (2018 02:59):    polymorphonuclear leukocytes seen    No organisms seen    by cytocentrifuge    Culture - Fungal, Body Fluid (collected 2018 00:31)  Source: .Body Fluid Pleural Fluid  Preliminary Report (2018 10:37):    Testing in progress    Culture - Blood (collected 2018 00:55)  Source: .Blood Blood-Peripheral  Preliminary Report (2018 01:01):    No growth to date.    Culture - Blood (collected 2018 00:55)  Source: .Blood Blood-Peripheral  Preliminary Report (2018 01:01):    No growth to date.    Culture - Acid Fast - Body Fluid w/Smear (collected 2018 22:09)  Source: .Body Fluid Pleural Fluid    Culture - Body Fluid with Gram Stain (collected 2018 22:09)  Source: .Body Fluid Pleural Fluid  Gram Stain (2018 03:01):    polymorphonuclear leukocytes seen per low power field    Gram positive cocci in pairs seen per oil power field    by cytocentrifuge  Preliminary Report (2018 21:52):    Moderate Parvimonas species "Susceptibilities not performed"    Culture - Blood (collected 2018 21:39)  Source: .Blood Blood  Preliminary Report (2018 22:01):    No growth to date.    Culture - Blood (collected 2018 21:39)  Source: .Blood Blood  Preliminary Report (2018 22:01):    No growth to date.    Cell Count, Body Fluid (18 @ 17:54)    Monocyte/Macrophage Count - Body Fluid: 5 %    Fluid Segmented Granulocytes: 85 %    Fluid Type: Pleural    Body Fluid Appearance: Cloudy    BF Lymphocytes: 10 %    Tube Type: Sterile    Color - Body Fluid: Yellow    Total Nucleated Cell Count, Body Fluid: 60123: Includes WBC and other nucleated cells if present. /uL    Total RBC Count: 5750 /uL          Lactate Dehydrogenase, Fluid (. @ 17:54)    Lactate Dehydrogenase, Fluid: 2123: Reference Ranges have NOT been established for analytes in body fluids  because of variability in body fluid composition.  The  has not determined the efficacy of this test when  performed on fluid specimens. The performance characteristics of this  test were determined by [facility]. U/L  Protein Total, Fluid (18 @ 17:54)    Protein Total, Fluid: 4.7: Reference Ranges have NOT been established for analytes in body fluids  because of variability in body fluid composition.  The  has not determined the efficacy of this test when  performed on fluid specimens. The performance characteristics of this  test were determined by [facility]. g/dL      < from: CT Angio Chest w/ IV Cont (18 @ 13:54) >  XAM:  CT ANGIO CHEST (W)AW IC                            PROCEDURE DATE:  2018            INTERPRETATION:  CLINICAL INFORMATION: Elevated d-dimer. Pain with   breathing.    COMPARISON: None.    PROCEDURE:   CT Angiography of the Chest.  90 ml of Omnipaque 350 was injected intravenously. 10 ml were discarded.  Sagittal and coronal reformats were performed as well as 3D (MIP)   reconstructions.      FINDINGS:    CHEST:     LUNGS AND LARGE AIRWAYS: Mucoid impaction of the distal airways of the   left lower lobe with groundglass opacities. Subsegmental atelectasis in   the right lower lobe, and passive compressive atelectasis in the left   lower lobe.    PLEURA: Loculated moderate left pleural effusion. No pneumothorax.    VESSELS: No pulmonary embolism. No thoracic aortic aneurysm.    HEART: Heart size is normal. No pericardial effusion.    MEDIASTINUM AND GALE: No lymphadenopathy.    CHEST WALL AND LOWER NECK: Within normal limits.    VISUALIZED UPPER ABDOMEN: Within normal limits.    BONES: Within normal limits.    IMPRESSION:     No pulmonary embolism.    Loculated moderate left pleural effusion with associated passive   compressive atelectasis in the left lower lobe.          < end of copied text >    Cytopathology - Non Gyn Report (18 @ 17:33)    Cytopathology - Non Gyn Report:   ACCESSION No:  [id number]    PAULY GARCÍA                       1        Cytopathology Report            Specimen(s) Submitted  PLEURAL FLUID, LEFT      Clinical History  Pleural effusion      Gross Description  Received: 100  ml of cloudy yellow fluid in CytoLyt  Prepared: 1 ThinPrep slide and 1 Smear  1 cell block      Final Diagnosis  PLEURAL FLUID, LEFT  NEGATIVE FOR MALIGNANT CELLS.    Note: The smears and cell block preparation consist of  predominantly acute inflammatory cells, raremacrophages,  and proteinaceous material.    Screened by: Ayesha EVANS(ASCP)  Verified by: Sumi Gomes MD  (Electronic Signature)  Reported on: 18 17:13 EST, [address] [place] [address]  Cytology technical processing performed at [address] [place] [address]  _________________________________________________________________ [facility], Division of Infectious Diseases  KODI Dang A. Lee  [phone]    PAULY GARCÍA  53y, Female  [id number]      HPI:  52yo female with no prior hx, presented to the ED with worsening left sided chest pain. Pt states earlier this week she developed left shoulder pain radiating to her left side of her chest and her back. She initially assumed cause of her pain was due to musculoskeletal etiology, ie frozen shoulder, from sleeping in a wrong position.    Day of admission, pt noted sudden onset sharp pain on the left side of her chest, radiating to her back associated with pleuritic chest pain and thought she was having a heart attack.  She denied sob, dizziness, blurry vision, N/V, palpitations. Pt also denies symptoms of fever or chills throughout this week. No cough, no sick contacts, no recent antibx  She states she took 2 baby ASA. She denies any relieving and aggravating factors.   found to have left pleural effusion s/p vats and chest tube times 2    PMH/PSH--  Herpes zoster  Uterine prolapse  Thyroid nodule  Dyslipidemia  Acoustic neuroma  S/P partial hysterectomy  S/P brain surgery  S/P  section      Allergies--latex      Medications--  Antibiotics: ampicillin/sulbactam  IVPB 3 Gram(s) IV Intermittent every 6 hours  ampicillin/sulbactam  IVPB        Immunologic: influenza   Vaccine 0.5 milliLiter(s) IntraMuscular once    Other: HYDROmorphone PCA (1 mG/mL)  HYDROmorphone PCA (1 mG/mL) Rescue Clinician Bolus PRN  lactated ringers.  naloxone Injectable PRN  ondansetron Injectable PRN      Social History--  EtOH: denies ***  Tobacco: denies ***  Drug Use: denies ***    Family/Marital History--      Remainder not relevant to clinical concern.    Travel/Environmental/Occupational History:      Review of Systems:  A >=10-point review of systems was obtained.     Pertinent positives and negatives--  Constitutional: No fevers. No Chills. No Rigors.   Eyes: no blurry vision  ENMT: no sore throat  Cardiovascular: No chest pain. No palpitations.  Respiratory: No shortness of breath. No cough.  Gastrointestinal: No nausea or vomiting. No diarrhea or constipation.   Genitourinary: no dysuria  Musculoskeletal: no myalgia  Skin: no rash  Neurologic: no headache  Psychiatric: no anxiety    Review of systems otherwise negative except as previously noted.    Physical Exam--  Vital Signs: T(F): 97.5 (18 @ 08:22), Max: 102 (18 @ 11:51)  HR: 80 (18 @ 08:22)  BP: 92/58 (18 @ 08:22)  RR: 18 (18 @ 08:22)  SpO2: 95% (18 @ 08:22)  Wt(kg): --  General: Nontoxic-appearing Female in no acute distress.  HEENT: AT/NC.  Anicteric. Conjunctiva pink and moist. Oropharynx clear. Dentition fair.  Neck: Not rigid. No sense of mass.  Nodes: None palpable.  Lungs: Clear bilaterally , left base crackles left chest tube times 2  Heart: Regular rate and rhythm. No Murmur. No rub. No gallop. No palpable thrill.  Abdomen: Bowel sounds present and normoactive. Soft. Nondistended. Nontender.   Back: No spinal tenderness. No costovertebral angle tenderness.   Extremities: No cyanosis or clubbing. + edema.   Skin: Warm. Dry. Good turgor. No rash. No vasculitic stigmata.  Psychiatric: Appropriate affect and mood for situation.         Laboratory & Imaging Data--  CBC                        11.1   16.31 )-----------( 388      ( 2018 08:16 )             33.4       Chemistries      139  |  106  |  11  ----------------------------<  120<H>  4.3   |  22  |  0.51    Ca    8.4      2018 06:56        Culture Data    Culture - Tissue with Gram Stain (collected 2018 00:32)  Source: .Tissue Other, #2 left lower lobe  Gram Stain (2018 03:39):    Few polymorphonuclear leukocytes seen per low power field    No organisms seen    Culture - Fungal, Tissue (collected 2018 00:32)  Source: .Tissue #2 left lower lobe  Preliminary Report (2018 10:32):    Testing in progress    Culture - Tissue with Gram Stain (collected 2018 00:32)  Source: .Tissue Other, #3 left lower lobe  Gram Stain (2018 06:17):    Few polymorphonuclear leukocytes seen per low power field    No organisms seen    Culture - Fungal, Tissue (collected 2018 00:32)  Source: .Tissue #3 left lower lobe  Preliminary Report (2018 10:32):    Testing in progress    Culture - Body Fluid with Gram Stain (collected 2018 00:31)  Source: .Body Fluid Pleural Fluid  Gram Stain (2018 02:59):    polymorphonuclear leukocytes seen    No organisms seen    by cytocentrifuge    Culture - Fungal, Body Fluid (collected 2018 00:31)  Source: .Body Fluid Pleural Fluid  Preliminary Report (2018 10:37):    Testing in progress    Culture - Blood (collected 2018 00:55)  Source: .Blood Blood-Peripheral  Preliminary Report (2018 01:01):    No growth to date.    Culture - Blood (collected 2018 00:55)  Source: .Blood Blood-Peripheral  Preliminary Report (2018 01:01):    No growth to date.    Culture - Acid Fast - Body Fluid w/Smear (collected 2018 22:09)  Source: .Body Fluid Pleural Fluid    Culture - Body Fluid with Gram Stain (collected 2018 22:09)  Source: .Body Fluid Pleural Fluid  Gram Stain (2018 03:01):    polymorphonuclear leukocytes seen per low power field    Gram positive cocci in pairs seen per oil power field    by cytocentrifuge  Preliminary Report (2018 21:52):    Moderate Parvimonas species "Susceptibilities not performed"    Culture - Blood (collected 2018 21:39)  Source: .Blood Blood  Preliminary Report (2018 22:01):    No growth to date.    Culture - Blood (collected 2018 21:39)  Source: .Blood Blood  Preliminary Report (2018 22:01):    No growth to date.    Cell Count, Body Fluid (18 @ 17:54)    Monocyte/Macrophage Count - Body Fluid: 5 %    Fluid Segmented Granulocytes: 85 %    Fluid Type: Pleural    Body Fluid Appearance: Cloudy    BF Lymphocytes: 10 %    Tube Type: Sterile    Color - Body Fluid: Yellow    Total Nucleated Cell Count, Body Fluid: 86412: Includes WBC and other nucleated cells if present. /uL    Total RBC Count: 5750 /uL          Lactate Dehydrogenase, Fluid (.18 @ 17:54)    Lactate Dehydrogenase, Fluid: 2123: Reference Ranges have NOT been established for analytes in body fluids  because of variability in body fluid composition.  The  has not determined the efficacy of this test when  performed on fluid specimens. The performance characteristics of this  test were determined by [facility]. U/L  Protein Total, Fluid (18 @ 17:54)    Protein Total, Fluid: 4.7: Reference Ranges have NOT been established for analytes in body fluids  because of variability in body fluid composition.  The  has not determined the efficacy of this test when  performed on fluid specimens. The performance characteristics of this  test were determined by [facility]. g/dL      < from: CT Angio Chest w/ IV Cont (18 @ 13:54) >  XAM:  CT ANGIO CHEST (W)AW IC                            PROCEDURE DATE:  2018            INTERPRETATION:  CLINICAL INFORMATION: Elevated d-dimer. Pain with   breathing.    COMPARISON: None.    PROCEDURE:   CT Angiography of the Chest.  90 ml of Omnipaque 350 was injected intravenously. 10 ml were discarded.  Sagittal and coronal reformats were performed as well as 3D (MIP)   reconstructions.      FINDINGS:    CHEST:     LUNGS AND LARGE AIRWAYS: Mucoid impaction of the distal airways of the   left lower lobe with groundglass opacities. Subsegmental atelectasis in   the right lower lobe, and passive compressive atelectasis in the left   lower lobe.    PLEURA: Loculated moderate left pleural effusion. No pneumothorax.    VESSELS: No pulmonary embolism. No thoracic aortic aneurysm.    HEART: Heart size is normal. No pericardial effusion.    MEDIASTINUM AND GALE: No lymphadenopathy.    CHEST WALL AND LOWER NECK: Within normal limits.    VISUALIZED UPPER ABDOMEN: Within normal limits.    BONES: Within normal limits.    IMPRESSION:     No pulmonary embolism.    Loculated moderate left pleural effusion with associated passive   compressive atelectasis in the left lower lobe.          < end of copied text >    Cytopathology - Non Gyn Report (18 @ 17:33)    Cytopathology - Non Gyn Report:   ACCESSION No:  [id number]    PAULY GARCÍA                       1        Cytopathology Report            Specimen(s) Submitted  PLEURAL FLUID, LEFT      Clinical History  Pleural effusion      Gross Description  Received: 100  ml of cloudy yellow fluid in CytoLyt  Prepared: 1 ThinPrep slide and 1 Smear  1 cell block      Final Diagnosis  PLEURAL FLUID, LEFT  NEGATIVE FOR MALIGNANT CELLS.    Note: The smears and cell block preparation consist of  predominantly acute inflammatory cells, raremacrophages,  and proteinaceous material.    Screened by: Ayesha EVANS(ASCP)  Verified by: Sumi Gomes MD  (Electronic Signature)  Reported on: 18 17:13 EST, [address] [place] [address]  Cytology technical processing performed at [address] [place] [address]  _________________________________________________________________

## 2018-11-21 NOTE — PROGRESS NOTE ADULT - SUBJECTIVE AND OBJECTIVE BOX
Day 1 of Anesthesia Pain Management Service    SUBJECTIVE: Patient is doing well with IV PCA    Pain Scale Score:	[X] Refer to charted pain scores    THERAPY:    [ ] IV PCA Morphine		[ ] 5 mg/mL	[ ] 1 mg/mL  [X] IV PCA Hydromorphone	[ ] 5 mg/mL	[X] 1 mg/mL  [ ] IV PCA Fentanyl		[ ] 50 micrograms/mL    Demand dose: 0.2 mg     Lockout: 6 minutes   Continuous Rate: 0 mg/hr  4 Hour Limit: 4 mg    MEDICATIONS  (STANDING):  ampicillin/sulbactam  IVPB 3 Gram(s) IV Intermittent every 6 hours  ampicillin/sulbactam  IVPB      HYDROmorphone PCA (1 mG/mL) 30 milliLiter(s) PCA Continuous PCA Continuous  influenza   Vaccine 0.5 milliLiter(s) IntraMuscular once  lactated ringers. 1000 milliLiter(s) (75 mL/Hr) IV Continuous <Continuous>    MEDICATIONS  (PRN):  HYDROmorphone PCA (1 mG/mL) Rescue Clinician Bolus 0.5 milliGRAM(s) IV Push every 15 minutes PRN for Pain Scale GREATER THAN 6  naloxone Injectable 0.1 milliGRAM(s) IV Push every 3 minutes PRN For ANY of the following changes in patient status:  A. RR LESS THAN 10 breaths per minute, B. Oxygen saturation LESS THAN 90%, C. Sedation score of 6  ondansetron Injectable 4 milliGRAM(s) IV Push every 6 hours PRN Nausea      OBJECTIVE:    Sedation Score:	[ X] Alert	[ ] Drowsy 	[ ] Arousable	[ ] Asleep	[ ] Unresponsive    Side Effects:	[X ] None	[ ] Nausea	[ ] Vomiting	[ ] Pruritus  		[ ] Other:    Vital Signs Last 24 Hrs  T(C): 36.4 (21 Nov 2018 08:22), Max: 38.9 (20 Nov 2018 11:51)  T(F): 97.5 (21 Nov 2018 08:22), Max: 102 (20 Nov 2018 11:51)  HR: 80 (21 Nov 2018 08:22) (78 - 94)  BP: 92/58 (21 Nov 2018 08:22) (92/58 - 119/71)  BP(mean): 82 (20 Nov 2018 21:00) (70 - 86)  RR: 18 (21 Nov 2018 08:22) (15 - 21)  SpO2: 95% (21 Nov 2018 08:22) (92% - 98%)    ASSESSMENT/ PLAN    Therapy to  be:               [X] Continued   [ ] Discontinued   [ ] Changed to PRN Analgesics    Documentation and Verification of current medications:   [X] Done	[ ] Not done, not eligible    Comments: Chest tubes x 2. Using 2-4x/hr. Reeducated

## 2018-11-21 NOTE — PROGRESS NOTE ADULT - SUBJECTIVE AND OBJECTIVE BOX
VITAL SIGNS    Telemetry:      Vital Signs Last 24 Hrs  T(C): 36.4 (11-21-18 @ 08:22), Max: 38.9 (11-20-18 @ 11:51)  T(F): 97.5 (11-21-18 @ 08:22), Max: 102 (11-20-18 @ 11:51)  HR: 80 (11-21-18 @ 08:22) (78 - 94)  BP: 92/58 (11-21-18 @ 08:22) (92/58 - 119/71)  RR: 18 (11-21-18 @ 08:22) (15 - 21)  SpO2: 95% (11-21-18 @ 08:22) (92% - 98%)                   Daily Height in cm: 160.02 (20 Nov 2018 12:13)    Daily         CAPILLARY BLOOD GLUCOSE              Drains              L Pleural    x 2  lws                           PHYSICAL EXAM    Neurology: alert and oriented x 3, moves all extremities with no defecits  CV :  RRR  Lungs:   CTA B/L  Abdomen: soft, nontender, nondistended, positive bowel sounds,  Extremities:       b/l le trace edema

## 2018-11-21 NOTE — ANESTHESIA FOLLOW-UP NOTE - NSEVALATION_GEN_ALL_CORE
No apparent complications or complaints regarding anesthesia care at this time
No apparent complications or complaints regarding anesthesia care at this time/All questions were answered

## 2018-11-21 NOTE — PROGRESS NOTE ADULT - SUBJECTIVE AND OBJECTIVE BOX
Follow-up Pulm Progress Note  Perez Albert MD  [phone]    POD #1 L VATS/Decortication for anerobic empyema  CXR with partial clearing L lung opacities  Pain controlled  Afebrtil on unasyn    Vital Signs Last 24 Hrs  T(C): 36.3 (21 Nov 2018 13:37), Max: 37.2 (20 Nov 2018 21:40)  T(F): 97.4 (21 Nov 2018 13:37), Max: 98.9 (20 Nov 2018 21:40)  HR: 77 (21 Nov 2018 13:37) (77 - 94)  BP: 108/68 (21 Nov 2018 13:37) (92/58 - 119/71)  BP(mean): 82 (20 Nov 2018 21:00) (70 - 86)  RR: 18 (21 Nov 2018 13:37) (15 - 21)  SpO2: 98% (21 Nov 2018 13:37) (92% - 98%)                        11.1   16.31 )-----------( 388      ( 21 Nov 2018 08:16 )             33.4       11-21    139  |  106  |  11  ----------------------------<  120<H>  4.3   |  22  |  0.51    Ca    8.4      21 Nov 2018 06:56      CULTURES:  Culture Results:   No growth to date. (11-17 @ 21:39)  Culture Results:   No growth to date. (11-17 @ 21:39)    Most recent blood culture -- 11-18 @ 22:09   -- -- .Body Fluid Pleural Fluid 11-18 @ 22:09  Most recent blood culture -- 11-17 @ 21:39   -- -- .Blood Blood 11-17 @ 21:39      Physical Examination:  PULM:   CVS: Regular rate and rhythm, no murmurs, rubs, or gallops  ABD: Soft, non-tender  EXT:  No clubbing, cyanosis, or edema    RADIOLOGY REVIEWED  CXR:    CT chest:    TTE:

## 2018-11-21 NOTE — PROGRESS NOTE ADULT - SUBJECTIVE AND OBJECTIVE BOX
Patient is a 53y old  Female who presents with a chief complaint of sp lt vats drainage of pleural effusion (2018 11:35)      INTERVAL HPI/OVERNIGHT EVENTS: noted, s/p  VATS, 2nd chest tube placed yesterday- draining well  pt states pleuritic pain improved      Vital Signs Last 24 Hrs  T(C): 36.4 (2018 08:22), Max: 37.2 (2018 21:40)  T(F): 97.5 (2018 08:22), Max: 98.9 (2018 21:40)  HR: 80 (2018 08:22) (78 - 94)  BP: 92/58 (2018 08:22) (92/58 - 119/71)  BP(mean): 82 (2018 21:00) (70 - 86)  RR: 18 (2018 08:22) (15 - 21)  SpO2: 95% (2018 08:22) (92% - 98%)    ampicillin/sulbactam  IVPB 3 Gram(s) IV Intermittent every 6 hours  ampicillin/sulbactam  IVPB      HYDROmorphone PCA (1 mG/mL) 30 milliLiter(s) PCA Continuous PCA Continuous  HYDROmorphone PCA (1 mG/mL) Rescue Clinician Bolus 0.5 milliGRAM(s) IV Push every 15 minutes PRN  influenza   Vaccine 0.5 milliLiter(s) IntraMuscular once  lactated ringers. 1000 milliLiter(s) IV Continuous <Continuous>  naloxone Injectable 0.1 milliGRAM(s) IV Push every 3 minutes PRN  ondansetron Injectable 4 milliGRAM(s) IV Push every 6 hours PRN      PHYSICAL EXAM:  GENERAL: NAD,   EYES: conjunctiva and sclera clear  ENMT: Moist mucous membranes  NECK: Supple, No JVD, Normal thyroid  NERVOUS SYSTEM:  Alert & Oriented X3,   CHEST/LUNG: Clear to auscultation bilaterally. 2 chest tubes to Lws  HEART: Regular rate and rhythm; No murmurs, rubs, or gallops  ABDOMEN: Soft, Nontender, Nondistended; Bowel sounds present  EXTREMITIES:  2+ Peripheral Pulses, No clubbing, cyanosis, or edema  LYMPH: No lymphadenopathy noted  SKIN: No rashes or lesions    Consultant(s) Notes Reviewed:  [x ] YES  [ ] NO  Care Discussed with Consultants/Other Providers [ x] YES  [ ] NO    LABS:                        11.1   16.31 )-----------( 388      ( 2018 08:16 )             33.4     11-    139  |  106  |  11  ----------------------------<  120<H>  4.3   |  22  |  0.51    Ca    8.4      2018 06:56      PT/INR - ( 2018 09:05 )   PT: 14.1 sec;   INR: 1.23 ratio         PTT - ( 2018 09:05 )  PTT:31.2 sec  Urinalysis Basic - ( 2018 00:23 )    Color: Light Yellow / Appearance: Clear / S.017 / pH: x  Gluc: x / Ketone: Small  / Bili: Negative / Urobili: Negative   Blood: x / Protein: Trace / Nitrite: Negative   Leuk Esterase: Negative / RBC: x / WBC x   Sq Epi: x / Non Sq Epi: x / Bacteria: x      CAPILLARY BLOOD GLUCOSE            Urinalysis Basic - ( 2018 00:23 )    Color: Light Yellow / Appearance: Clear / S.017 / pH: x  Gluc: x / Ketone: Small  / Bili: Negative / Urobili: Negative   Blood: x / Protein: Trace / Nitrite: Negative   Leuk Esterase: Negative / RBC: x / WBC x   Sq Epi: x / Non Sq Epi: x / Bacteria: x        RADIOLOGY & ADDITIONAL TESTS:    Imaging Personally Reviewed:  [x ] YES  [ ] NO

## 2018-11-21 NOTE — PROGRESS NOTE ADULT - ASSESSMENT
LLL pneumonia with empyema, now postop L VATS  Continue unasyn: anticipate 4 weeks total abx: convert to oral regimen on DC per ID  CT managemt per thoracic team

## 2018-11-21 NOTE — CONSULT NOTE ADULT - ASSESSMENT
53f with no pmh here with left chest pain, fever, leukocytosis  left pleural effusion-- s/p vats -- exudative by lights criteria 53f with no pmh here with left chest pain, fever, leukocytosis  left pleural effusion-- s/p vats -- exudative by lights criteria  empyema

## 2018-11-21 NOTE — PROGRESS NOTE ADULT - PROBLEM SELECTOR PLAN 1
sp lt vats  drainage  2 chest tubes to lws   for today  pca for pain  OOB  Ambulate  please   may come off lws  to ambulate  NO meals in Bed sp lt vats  drainage  2 chest tubes to lws   for today  probable one tube out thursday and H20  pca for pain  OOB  Ambulate  please   may come off lws  to ambulate  NO meals in Bed

## 2018-11-21 NOTE — PROGRESS NOTE ADULT - PROBLEM SELECTOR PLAN 1
empyema  Continue unasyn  d/w ID plan of care  follow up with Blood cultures, Urine Legionella Ag  sp IR pigtail, empyema, pleural cx growing parvimonas  CTS  for VATS,-2 chest tubes in place  Tylenol prn, oxycodone for severe pain

## 2018-11-21 NOTE — CONSULT NOTE ADULT - PROBLEM SELECTOR RECOMMENDATION 9
cx with parvimonas  exudate by lights criteria  cont unasyn  follow temp   and monitor wbc  11/18 cytopath neg for malignant cells  11/20 cytopath pending.  f/u all cx for 11/20 exudate by lights criteria  empyema with cx parvimona  cont unasyn  follow temp   and monitor wbc  11/18 cytopath neg for malignant cells  11/20 cytopath pending.  f/u all cx for 11/20

## 2018-11-21 NOTE — PROGRESS NOTE ADULT - ASSESSMENT
11/21  Lt vats  drainage of pleural effusion   2 chest tubes placed     11/22   lt chest tubes to lws   draining ,  pca for pain

## 2018-11-22 LAB
ANION GAP SERPL CALC-SCNC: 10 MMOL/L — SIGNIFICANT CHANGE UP (ref 5–17)
BUN SERPL-MCNC: 10 MG/DL — SIGNIFICANT CHANGE UP (ref 7–23)
CALCIUM SERPL-MCNC: 8.1 MG/DL — LOW (ref 8.4–10.5)
CHLORIDE SERPL-SCNC: 103 MMOL/L — SIGNIFICANT CHANGE UP (ref 96–108)
CO2 SERPL-SCNC: 23 MMOL/L — SIGNIFICANT CHANGE UP (ref 22–31)
CREAT SERPL-MCNC: 0.48 MG/DL — LOW (ref 0.5–1.3)
CULTURE RESULTS: SIGNIFICANT CHANGE UP
CULTURE RESULTS: SIGNIFICANT CHANGE UP
GLUCOSE SERPL-MCNC: 120 MG/DL — HIGH (ref 70–99)
HCT VFR BLD CALC: 33.6 % — LOW (ref 34.5–45)
HGB BLD-MCNC: 11 G/DL — LOW (ref 11.5–15.5)
MCHC RBC-ENTMCNC: 29.6 PG — SIGNIFICANT CHANGE UP (ref 27–34)
MCHC RBC-ENTMCNC: 32.7 GM/DL — SIGNIFICANT CHANGE UP (ref 32–36)
MCV RBC AUTO: 90.6 FL — SIGNIFICANT CHANGE UP (ref 80–100)
PLATELET # BLD AUTO: 431 K/UL — HIGH (ref 150–400)
POTASSIUM SERPL-MCNC: 4 MMOL/L — SIGNIFICANT CHANGE UP (ref 3.5–5.3)
POTASSIUM SERPL-SCNC: 4 MMOL/L — SIGNIFICANT CHANGE UP (ref 3.5–5.3)
RBC # BLD: 3.71 M/UL — LOW (ref 3.8–5.2)
RBC # FLD: 12.9 % — SIGNIFICANT CHANGE UP (ref 10.3–14.5)
SODIUM SERPL-SCNC: 136 MMOL/L — SIGNIFICANT CHANGE UP (ref 135–145)
SPECIMEN SOURCE: SIGNIFICANT CHANGE UP
SPECIMEN SOURCE: SIGNIFICANT CHANGE UP
WBC # BLD: 12.46 K/UL — HIGH (ref 3.8–10.5)
WBC # FLD AUTO: 12.46 K/UL — HIGH (ref 3.8–10.5)

## 2018-11-22 PROCEDURE — 71045 X-RAY EXAM CHEST 1 VIEW: CPT | Mod: 26

## 2018-11-22 RX ORDER — OXYCODONE AND ACETAMINOPHEN 5; 325 MG/1; MG/1
1 TABLET ORAL EVERY 4 HOURS
Qty: 0 | Refills: 0 | Status: DISCONTINUED | OUTPATIENT
Start: 2018-11-22 | End: 2018-11-26

## 2018-11-22 RX ORDER — ACETAMINOPHEN 500 MG
650 TABLET ORAL EVERY 6 HOURS
Qty: 0 | Refills: 0 | Status: DISCONTINUED | OUTPATIENT
Start: 2018-11-22 | End: 2018-11-26

## 2018-11-22 RX ORDER — OXYCODONE AND ACETAMINOPHEN 5; 325 MG/1; MG/1
2 TABLET ORAL EVERY 4 HOURS
Qty: 0 | Refills: 0 | Status: DISCONTINUED | OUTPATIENT
Start: 2018-11-22 | End: 2018-11-26

## 2018-11-22 RX ADMIN — AMPICILLIN SODIUM AND SULBACTAM SODIUM 200 GRAM(S): 250; 125 INJECTION, POWDER, FOR SUSPENSION INTRAMUSCULAR; INTRAVENOUS at 23:50

## 2018-11-22 RX ADMIN — OXYCODONE AND ACETAMINOPHEN 2 TABLET(S): 5; 325 TABLET ORAL at 11:17

## 2018-11-22 RX ADMIN — AMPICILLIN SODIUM AND SULBACTAM SODIUM 200 GRAM(S): 250; 125 INJECTION, POWDER, FOR SUSPENSION INTRAMUSCULAR; INTRAVENOUS at 13:12

## 2018-11-22 RX ADMIN — OXYCODONE AND ACETAMINOPHEN 2 TABLET(S): 5; 325 TABLET ORAL at 22:30

## 2018-11-22 RX ADMIN — OXYCODONE AND ACETAMINOPHEN 2 TABLET(S): 5; 325 TABLET ORAL at 17:29

## 2018-11-22 RX ADMIN — OXYCODONE AND ACETAMINOPHEN 2 TABLET(S): 5; 325 TABLET ORAL at 21:49

## 2018-11-22 RX ADMIN — AMPICILLIN SODIUM AND SULBACTAM SODIUM 200 GRAM(S): 250; 125 INJECTION, POWDER, FOR SUSPENSION INTRAMUSCULAR; INTRAVENOUS at 06:05

## 2018-11-22 RX ADMIN — OXYCODONE AND ACETAMINOPHEN 2 TABLET(S): 5; 325 TABLET ORAL at 16:24

## 2018-11-22 RX ADMIN — OXYCODONE AND ACETAMINOPHEN 2 TABLET(S): 5; 325 TABLET ORAL at 12:27

## 2018-11-22 RX ADMIN — AMPICILLIN SODIUM AND SULBACTAM SODIUM 200 GRAM(S): 250; 125 INJECTION, POWDER, FOR SUSPENSION INTRAMUSCULAR; INTRAVENOUS at 18:48

## 2018-11-22 NOTE — PROGRESS NOTE ADULT - PROBLEM SELECTOR PLAN 1
empyema  Continue unasyn  d/w ID plan of care  follow up with Blood cultures, Urine Legionella Ag  sp IR pigtail, empyema, pleural cx growing parvimonas  CTS  for VATS,-2 chest tubes in place  Tylenol prn, oxycodone for severe pain  Plan for Chest tube removal today by thoracic team-f/u cxr

## 2018-11-22 NOTE — PROGRESS NOTE ADULT - SUBJECTIVE AND OBJECTIVE BOX
Patient is a 53y old  Female who presents with a chief complaint of chest pain, left shoulder pain (21 Nov 2018 15:35)      INTERVAL HPI/OVERNIGHT EVENTS: noted, feels well      Vital Signs Last 24 Hrs  T(C): 37.4 (22 Nov 2018 05:58), Max: 37.4 (22 Nov 2018 05:58)  T(F): 99.3 (22 Nov 2018 05:58), Max: 99.3 (22 Nov 2018 05:58)  HR: 87 (22 Nov 2018 05:58) (74 - 88)  BP: 99/62 (22 Nov 2018 05:58) (90/56 - 108/68)  BP(mean): --  RR: 18 (22 Nov 2018 05:58) (18 - 18)  SpO2: 97% (22 Nov 2018 05:58) (97% - 100%)    ampicillin/sulbactam  IVPB 3 Gram(s) IV Intermittent every 6 hours  ampicillin/sulbactam  IVPB      HYDROmorphone PCA (1 mG/mL) 30 milliLiter(s) PCA Continuous PCA Continuous  HYDROmorphone PCA (1 mG/mL) Rescue Clinician Bolus 0.5 milliGRAM(s) IV Push every 15 minutes PRN  influenza   Vaccine 0.5 milliLiter(s) IntraMuscular once  lactated ringers. 1000 milliLiter(s) IV Continuous <Continuous>  naloxone Injectable 0.1 milliGRAM(s) IV Push every 3 minutes PRN  ondansetron Injectable 4 milliGRAM(s) IV Push every 6 hours PRN      PHYSICAL EXAM:  GENERAL: NAD,   EYES: conjunctiva and sclera clear  ENMT: Moist mucous membranes  NECK: Supple, No JVD, Normal thyroid  NERVOUS SYSTEM:  Alert & Oriented X3,   CHEST/LUNG: Clear to auscultation bilaterally; No rales, rhonchi, wheezing, or rubs  HEART: Regular rate and rhythm; No murmurs, rubs, or gallops  ABDOMEN: Soft, Nontender, Nondistended; Bowel sounds present  EXTREMITIES:  2+ Peripheral Pulses, No clubbing, cyanosis, or edema  LYMPH: No lymphadenopathy noted  SKIN: No rashes or lesions    Consultant(s) Notes Reviewed:  [x ] YES  [ ] NO  Care Discussed with Consultants/Other Providers [ x] YES  [ ] NO    LABS:                        11.0   12.46 )-----------( 431      ( 22 Nov 2018 08:20 )             33.6     11-22    136  |  103  |  10  ----------------------------<  120<H>  4.0   |  23  |  0.48<L>    Ca    8.1<L>      22 Nov 2018 07:10          CAPILLARY BLOOD GLUCOSE                RADIOLOGY & ADDITIONAL TESTS:    Imaging Personally Reviewed:  [ x] YES  [ ] NO

## 2018-11-22 NOTE — PROGRESS NOTE ADULT - ASSESSMENT
53f with no pmh here with left chest pain, fever, leukocytosis  left pleural effusion-- s/p vats 11/20 -- exudative by lights criteria  anaerobic empyema

## 2018-11-22 NOTE — PROGRESS NOTE ADULT - SUBJECTIVE AND OBJECTIVE BOX
Day _2_ of Anesthesia Pain Management Service    SUBJECTIVE:    Pain Scale Score	At rest: ___ 	With Activity: ___ 	[ x] Refer to charted pain scores    THERAPY:    [ ] IV PCA Morphine		[ ] 5 mg/mL	[ ] 1 mg/mL  [x ] IV PCA Hydromorphone	[ ] 5 mg/mL	[x ] 1 mg/mL  [ ] IV PCA Fentanyl		[ ] 50 micrograms/mL    Demand dose 0.2mg    lockout  6  (minutes) 0Continuous Rate          MEDICATIONS  (STANDING):  ampicillin/sulbactam  IVPB 3 Gram(s) IV Intermittent every 6 hours  ampicillin/sulbactam  IVPB      HYDROmorphone PCA (1 mG/mL) 30 milliLiter(s) PCA Continuous PCA Continuous  influenza   Vaccine 0.5 milliLiter(s) IntraMuscular once  lactated ringers. 1000 milliLiter(s) (30 mL/Hr) IV Continuous <Continuous>    MEDICATIONS  (PRN):  HYDROmorphone PCA (1 mG/mL) Rescue Clinician Bolus 0.5 milliGRAM(s) IV Push every 15 minutes PRN for Pain Scale GREATER THAN 6  naloxone Injectable 0.1 milliGRAM(s) IV Push every 3 minutes PRN For ANY of the following changes in patient status:  A. RR LESS THAN 10 breaths per minute, B. Oxygen saturation LESS THAN 90%, C. Sedation score of 6  ondansetron Injectable 4 milliGRAM(s) IV Push every 6 hours PRN Nausea      OBJECTIVE:    Sedation Score:	[x ] Alert	[ ] Drowsy 	[ ] Arousable	[ ] Asleep	[ ] Unresponsive    Side Effects:	[ x] None	[ ] Nausea	[ ] Vomiting	[ ] Pruritus  		[ ] Other:    Vital Signs Last 24 Hrs  T(C): 37.4 (22 Nov 2018 05:58), Max: 37.4 (22 Nov 2018 05:58)  T(F): 99.3 (22 Nov 2018 05:58), Max: 99.3 (22 Nov 2018 05:58)  HR: 87 (22 Nov 2018 05:58) (74 - 88)  BP: 99/62 (22 Nov 2018 05:58) (90/56 - 108/68)  BP(mean): --  RR: 18 (22 Nov 2018 05:58) (18 - 18)  SpO2: 97% (22 Nov 2018 05:58) (97% - 100%)    ASSESSMENT/ PLAN    Therapy to  be:	[x ] Continue   [ ] Discontinued   [ ] Change to prn Analgesics    Documentation and Verification of current medications:   [X] Done	[ ] Not done, not elligible    Comments: I was physically present for the key portionjs of the evaluation and management service provided. I agree with the above history, physical, and plan which I have reviewed and edited where appropriate

## 2018-11-22 NOTE — PROGRESS NOTE ADULT - PROBLEM SELECTOR PLAN 1
sp lt vats  drainage  2 chest tubes to   h20    for probable one tube  today  pca    to PO pain meds  OOB  Ambulate  please   may come off lws  to ambulate  NO meals in Bed sp lt vats  drainage ,      erpeat pl cx dent from tubing  2 chest tubes to   h20    for probable one tube  today  pca    to PO pain meds  OOB  Ambulate  please   may come off lws  to ambulate  NO meals in Bed

## 2018-11-22 NOTE — PROGRESS NOTE ADULT - PROBLEM SELECTOR PLAN 1
based on anaerobic growth anticipate 4 weeks of therapy from date of decortication. Continue Unasyn for now and after chest tubes removed and radiographic documentation of stability can look at transition to augmentin for minimum end date of 12/18.

## 2018-11-22 NOTE — PROGRESS NOTE ADULT - SUBJECTIVE AND OBJECTIVE BOX
infectious diseases progress note:    PAULY GARCÍA is a 53y y. o. Female patient    Patient reports: no new issues    ROS:    EYES:  Negative  blurry vision or double vision  GASTROINTESTINAL:  Negative for nausea, vomiting, diarrhea  -otherwise negative except for subjective    Allergies    latex (Rash)  No Known Drug Allergies    Intolerances        ANTIBIOTICS/RELEVANT:  antimicrobials  ampicillin/sulbactam  IVPB 3 Gram(s) IV Intermittent every 6 hours  ampicillin/sulbactam  IVPB        immunologic:  influenza   Vaccine 0.5 milliLiter(s) IntraMuscular once    OTHER:  HYDROmorphone PCA (1 mG/mL) 30 milliLiter(s) PCA Continuous PCA Continuous  HYDROmorphone PCA (1 mG/mL) Rescue Clinician Bolus 0.5 milliGRAM(s) IV Push every 15 minutes PRN  lactated ringers. 1000 milliLiter(s) IV Continuous <Continuous>  naloxone Injectable 0.1 milliGRAM(s) IV Push every 3 minutes PRN  ondansetron Injectable 4 milliGRAM(s) IV Push every 6 hours PRN      Objective:  Last 24-Vital Signs Last 24 Hrs  T(C): 37.4 (22 Nov 2018 05:58), Max: 37.4 (22 Nov 2018 05:58)  T(F): 99.3 (22 Nov 2018 05:58), Max: 99.3 (22 Nov 2018 05:58)  HR: 87 (22 Nov 2018 05:58) (74 - 88)  BP: 99/62 (22 Nov 2018 05:58) (90/56 - 108/68)  BP(mean): --  RR: 18 (22 Nov 2018 05:58) (18 - 18)  SpO2: 97% (22 Nov 2018 05:58) (97% - 100%)    T(C): 37.4 (11-22-18 @ 05:58), Max: 38.9 (11-20-18 @ 11:51)  T(F): 99.3 (11-22-18 @ 05:58), Max: 102 (11-20-18 @ 11:51)  T(C): 37.4 (11-22-18 @ 05:58), Max: 38.9 (11-20-18 @ 11:51)  T(F): 99.3 (11-22-18 @ 05:58), Max: 102 (11-20-18 @ 11:51)  T(C): 37.4 (11-22-18 @ 05:58), Max: 38.9 (11-20-18 @ 11:51)  T(F): 99.3 (11-22-18 @ 05:58), Max: 102 (11-20-18 @ 11:51)    PHYSICAL EXAM:  Constitutional: Well-developed, well nourished  Eyes: PERRLA, EOMI  Ear/Nose/Throat: oropharynx normal	  Neck: no JVD, no lymphadenopathy, supple  Respiratory: no accessory muscle use, lung fields with crackles in left base and chest tubes in place  Cardiovascular: RRR, normal S1, S2 no m/r/g  Gastrointestinal: soft, NT, no HSM, BS-normal  Extremities: no clubbing, no cyanosis, edema absent  Neuro: patient alert, oriented and appropriate  Skin: no sig lesions      LABS:                        11.0   12.46 )-----------( 431      ( 22 Nov 2018 08:20 )             33.6       WBC 12.46  11-22 @ 08:20  WBC 16.31  11-21 @ 08:16  WBC 14.94  11-20 @ 07:53  WBC 16.25  11-19 @ 09:05  WBC 15.02  11-18 @ 11:31  WBC 13.9  11-17 @ 11:47      11-22    136  |  103  |  10  ----------------------------<  120<H>  4.0   |  23  |  0.48<L>    Ca    8.1<L>      22 Nov 2018 07:10        Creatinine, Serum: 0.48 mg/dL (11-22-18 @ 07:10)  Creatinine, Serum: 0.51 mg/dL (11-21-18 @ 06:56)  Creatinine, Serum: 0.45 mg/dL (11-20-18 @ 07:07)  Creatinine, Serum: 0.52 mg/dL (11-19-18 @ 07:26)  Creatinine, Serum: 0.63 mg/dL (11-18-18 @ 07:49)  Creatinine, Serum: 0.66 mg/dL (11-17-18 @ 11:47)                MICROBIOLOGY:              RADIOLOGY & ADDITIONAL STUDIES:

## 2018-11-22 NOTE — PROGRESS NOTE ADULT - ASSESSMENT
11/21  Lt vats  drainage of pleural effusion   2 chest tubes placed     11/22 chest tubes to water seal       ck  room air sats ,       minimal drainage fro chest tubes 11/21  Lt vats  drainage of pleural effusion   2 chest tubes placed     11/22 chest tubes to water seal       ck  room air sats ,       minimal drainage fro chest tubes   pl cx sent from tubing

## 2018-11-22 NOTE — PROGRESS NOTE ADULT - SUBJECTIVE AND OBJECTIVE BOX
VITAL SIGNS    Vital Signs Last 24 Hrs  T(C): 37.4 (11-22-18 @ 05:58), Max: 37.4 (11-22-18 @ 05:58)  T(F): 99.3 (11-22-18 @ 05:58), Max: 99.3 (11-22-18 @ 05:58)  HR: 87 (11-22-18 @ 05:58) (74 - 88)  BP: 99/62 (11-22-18 @ 05:58) (90/56 - 108/68)  RR: 18 (11-22-18 @ 05:58) (18 - 18)  SpO2: 97% (11-22-18 @ 05:58) (97% - 100%)           Daily         CAPILLARY BLOOD GLUCOSE              Drains:                L Pleural   h20                             PHYSICAL EXAM    Neurology: alert and oriented x 3, moves all extremities with no defecits  CV :  RRR    Lungs:     lt lung diminshed  Abdomen: soft, nontender, nondistended, positive bowel sounds,  Extremities:       no edema   no calf tenderness

## 2018-11-23 LAB
HCT VFR BLD CALC: 32 % — LOW (ref 34.5–45)
HGB BLD-MCNC: 10.3 G/DL — LOW (ref 11.5–15.5)
MCHC RBC-ENTMCNC: 27.8 PG — SIGNIFICANT CHANGE UP (ref 27–34)
MCHC RBC-ENTMCNC: 32.2 GM/DL — SIGNIFICANT CHANGE UP (ref 32–36)
MCV RBC AUTO: 86.5 FL — SIGNIFICANT CHANGE UP (ref 80–100)
PLATELET # BLD AUTO: 446 K/UL — HIGH (ref 150–400)
RBC # BLD: 3.7 M/UL — LOW (ref 3.8–5.2)
RBC # FLD: 12.9 % — SIGNIFICANT CHANGE UP (ref 10.3–14.5)
WBC # BLD: 10.28 K/UL — SIGNIFICANT CHANGE UP (ref 3.8–10.5)
WBC # FLD AUTO: 10.28 K/UL — SIGNIFICANT CHANGE UP (ref 3.8–10.5)

## 2018-11-23 PROCEDURE — 71045 X-RAY EXAM CHEST 1 VIEW: CPT | Mod: 26

## 2018-11-23 RX ORDER — LACTOBACILLUS ACIDOPHILUS 100MM CELL
1 CAPSULE ORAL
Qty: 0 | Refills: 0 | Status: DISCONTINUED | OUTPATIENT
Start: 2018-11-23 | End: 2018-11-26

## 2018-11-23 RX ADMIN — OXYCODONE AND ACETAMINOPHEN 2 TABLET(S): 5; 325 TABLET ORAL at 03:09

## 2018-11-23 RX ADMIN — AMPICILLIN SODIUM AND SULBACTAM SODIUM 200 GRAM(S): 250; 125 INJECTION, POWDER, FOR SUSPENSION INTRAMUSCULAR; INTRAVENOUS at 21:23

## 2018-11-23 RX ADMIN — OXYCODONE AND ACETAMINOPHEN 2 TABLET(S): 5; 325 TABLET ORAL at 14:15

## 2018-11-23 RX ADMIN — OXYCODONE AND ACETAMINOPHEN 2 TABLET(S): 5; 325 TABLET ORAL at 20:15

## 2018-11-23 RX ADMIN — OXYCODONE AND ACETAMINOPHEN 2 TABLET(S): 5; 325 TABLET ORAL at 08:13

## 2018-11-23 RX ADMIN — OXYCODONE AND ACETAMINOPHEN 2 TABLET(S): 5; 325 TABLET ORAL at 19:45

## 2018-11-23 RX ADMIN — OXYCODONE AND ACETAMINOPHEN 2 TABLET(S): 5; 325 TABLET ORAL at 13:15

## 2018-11-23 RX ADMIN — Medication 1 TABLET(S): at 16:27

## 2018-11-23 RX ADMIN — AMPICILLIN SODIUM AND SULBACTAM SODIUM 200 GRAM(S): 250; 125 INJECTION, POWDER, FOR SUSPENSION INTRAMUSCULAR; INTRAVENOUS at 14:16

## 2018-11-23 RX ADMIN — AMPICILLIN SODIUM AND SULBACTAM SODIUM 200 GRAM(S): 250; 125 INJECTION, POWDER, FOR SUSPENSION INTRAMUSCULAR; INTRAVENOUS at 06:50

## 2018-11-23 RX ADMIN — OXYCODONE AND ACETAMINOPHEN 2 TABLET(S): 5; 325 TABLET ORAL at 09:15

## 2018-11-23 RX ADMIN — OXYCODONE AND ACETAMINOPHEN 2 TABLET(S): 5; 325 TABLET ORAL at 02:37

## 2018-11-23 NOTE — DIETITIAN INITIAL EVALUATION ADULT. - OTHER INFO
Patient seen for routine LOS assessment.  Patient found sitting up in chair and with  at bedside.  Patient reports a good appetite and intake.  Denies any complaints and no need for soft diet.  PTA. patient ate well, 3 balanced meals.  Supplemented with Multivitamin, Vit D, calcium and Niacin.    Currently on acidophilus while on antibiotics.  No history of weight change and nutritionally stable.

## 2018-11-23 NOTE — DIETITIAN INITIAL EVALUATION ADULT. - PROBLEM SELECTOR PLAN 3
-CTA Chest findings for Loculated Moderate size pleural effusion  -This is likely secondary to Pneumonia  -I personally discussed the pt's case with pts PMD, Dr. Blanquita Jacob regarding Thoracentesis for Loculated pleural effusion. Dr. Jacob agrees and will contact Pulmonology.

## 2018-11-23 NOTE — PROGRESS NOTE ADULT - SUBJECTIVE AND OBJECTIVE BOX
Follow-up Pulm Progress Note  Perez Albert MD  [phone]    POD #3 L VATS/Decortication for anerobic empyema  CT's to water seal  CXR with partial clearing L lung opacities  Pain controlled: in good spirits, without SOB  Afebrile on unasyn    Vital Signs Last 24 Hrs  T(C): 36.7 (23 Nov 2018 06:49), Max: 37.1 (22 Nov 2018 12:32)  T(F): 98 (23 Nov 2018 06:49), Max: 98.8 (22 Nov 2018 12:32)  HR: 73 (23 Nov 2018 06:49) (73 - 98)  BP: 101/69 (23 Nov 2018 06:49) (100/65 - 104/70)  BP(mean): --  RR: 18 (23 Nov 2018 06:49) (18 - 18)  SpO2: 96% (23 Nov 2018 06:49) (96% - 97%)                        10.3   10.28 )-----------( 446      ( 23 Nov 2018 08:17 )             32.0     11-22    136  |  103  |  10  ----------------------------<  120<H>  4.0   |  23  |  0.48<L>    Ca    8.1<L>      22 Nov 2018 07:10      CULTURES:  Culture Results:   No growth to date. (11-17 @ 21:39)  Culture Results:   No growth to date. (11-17 @ 21:39)    Most recent blood culture -- 11-18 @ 22:09   -- -- .Body Fluid Pleural Fluid 11-18 @ 22:09  Most recent blood culture -- 11-17 @ 21:39   -- -- .Blood Blood 11-17 @ 21:39      Physical Examination:  PULM:   CVS: Regular rate and rhythm, no murmurs, rubs, or gallops  ABD: Soft, non-tender  EXT:  No clubbing, cyanosis, or edema    RADIOLOGY REVIEWED  CXR:    CT chest:    TTE:

## 2018-11-23 NOTE — PROGRESS NOTE ADULT - ASSESSMENT
11/21  Lt vats  drainage of pleural effusion   2 chest tubes placed     11/22 chest tubes to water seal       ck  room air sats ,       minimal drainage fro chest tubes   pl cx sent from tubing  11/23: Prelim pl cx no organisim. Will review cxr today and will likely remove anterior today

## 2018-11-23 NOTE — PROGRESS NOTE ADULT - SUBJECTIVE AND OBJECTIVE BOX
Subjective: Pt states" " Denies any CP, SOB, palpitations. No acute events overnight.    Vital Signs:  Vital Signs Last 24 Hrs  T(C): 36.7 (18 @ 06:49), Max: 37.1 (18 @ 12:32)  T(F): 98 (18 @ 06:49), Max: 98.8 (18 @ 12:32)  HR: 73 (18 @ 06:49) (73 - 98)  BP: 101/69 (18 @ 06:49) (100/65 - 104/70)  RR: 18 (18 @ 06:49) (18 - 18)  SpO2: 96% (18 @ 06:49) (96% - 97%) on (O2)        Relevant labs, radiology and Medications reviewed                        10.3   1028 )-----------( 446      ( 2018 08:17 )             32.0         136  |  103  |  10  ----------------------------<  120<H>  4.0   |  23  |  0.48<L>    Ca    8.1<L>      2018 07:10        MEDICATIONS  (STANDING):  ampicillin/sulbactam  IVPB 3 Gram(s) IV Intermittent every 6 hours  ampicillin/sulbactam  IVPB      influenza   Vaccine 0.5 milliLiter(s) IntraMuscular once  lactobacillus acidophilus 1 Tablet(s) Oral two times a day with meals    MEDICATIONS  (PRN):  acetaminophen   Tablet .. 650 milliGRAM(s) Oral every 6 hours PRN Mild Pain (1 - 3)  oxyCODONE    5 mG/acetaminophen 325 mG 1 Tablet(s) Oral every 4 hours PRN Moderate Pain (4 - 6)  oxyCODONE    5 mG/acetaminophen 325 mG 2 Tablet(s) Oral every 4 hours PRN Severe Pain (7 - 10)      I&O's Summary    2018 07:01  -  2018 07:00  --------------------------------------------------------  IN: 1160 mL / OUT: 27 mL / NET: 1133 mL        IMAGING- Reviewed by Dr. Ward    CXR:    CT Chest:    PAST MEDICAL & SURGICAL HISTORY:  Herpes zoster  Uterine prolapse  Thyroid nodule  Dyslipidemia  Acoustic neuroma  S/P partial hysterectomy  S/P brain surgery: acoutic neuromia  S/P  section:        Physical Exam:  Neurology: A&Ox3, nonfocal, BARROS x 4, NAD  Respiratory: B/L BS CTA, diminished at Left base, No wheezing, rales, rhonchi  CV: RRR, S1S2

## 2018-11-23 NOTE — DIETITIAN INITIAL EVALUATION ADULT. - PROBLEM SELECTOR PLAN 1
-Fever of 102.3 + Leukocytosis with loculated pleural effusion findings on CTA Chest  -Hemodynamically stable, tolerating PO  -S/p Ceftriaxone + Azithromycin in the ED  -Continue Ceftriaxone + Azithromycin  -Follow up with Blood cultures, Urine Legionella Ag  -I personally discussed the pt's case with pts PMD, Dr. Blanquita Jacob regarding Thoracentesis for Loculated pleural effusion. Dr. Jacob agrees and will contact Pulmonology.   -Tylenol prn

## 2018-11-23 NOTE — PROGRESS NOTE ADULT - PROBLEM SELECTOR PLAN 1
empyema  Continue unasyn  d/w ID plan of care  follow up with Blood cultures, Urine Legionella Ag  sp IR pigtail, empyema, pleural cx growing parvimonas  CTS  for VATS,-2 chest tubes in place  Tylenol prn, oxycodone for severe pain  Plan to dc one Chest tube today by thoracic team pending  cxr fu

## 2018-11-23 NOTE — PROGRESS NOTE ADULT - SUBJECTIVE AND OBJECTIVE BOX
Patient is a 53y old  Female who presents with a chief complaint of chest pain, left shoulder pain (23 Nov 2018 11:29)      INTERVAL HPI/OVERNIGHT EVENTS: feels well, pain controlled w percocet      Vital Signs Last 24 Hrs  T(C): 37 (23 Nov 2018 16:18), Max: 37 (23 Nov 2018 16:18)  T(F): 98.6 (23 Nov 2018 16:18), Max: 98.6 (23 Nov 2018 16:18)  HR: 79 (23 Nov 2018 16:18) (73 - 98)  BP: 113/68 (23 Nov 2018 16:18) (100/65 - 113/68)  BP(mean): --  RR: 18 (23 Nov 2018 16:18) (18 - 18)  SpO2: 98% (23 Nov 2018 16:18) (96% - 98%)    acetaminophen   Tablet .. 650 milliGRAM(s) Oral every 6 hours PRN  ampicillin/sulbactam  IVPB 3 Gram(s) IV Intermittent every 6 hours  ampicillin/sulbactam  IVPB      influenza   Vaccine 0.5 milliLiter(s) IntraMuscular once  lactobacillus acidophilus 1 Tablet(s) Oral two times a day with meals  oxyCODONE    5 mG/acetaminophen 325 mG 1 Tablet(s) Oral every 4 hours PRN  oxyCODONE    5 mG/acetaminophen 325 mG 2 Tablet(s) Oral every 4 hours PRN      PHYSICAL EXAM:  GENERAL: NAD,   EYES: conjunctiva and sclera clear  ENMT: Moist mucous membranes  NECK: Supple, No JVD, Normal thyroid  NERVOUS SYSTEM:  Alert & Oriented X3,   CHEST/LUNG: Clear to auscultation bilaterally; No rales, rhonchi, wheezing, or rubs, 2 chest tubes  HEART: Regular rate and rhythm; No murmurs, rubs, or gallops  ABDOMEN: Soft, Nontender, Nondistended; Bowel sounds present  EXTREMITIES:  2+ Peripheral Pulses, No clubbing, cyanosis, or edema  LYMPH: No lymphadenopathy noted  SKIN: No rashes or lesions    Consultant(s) Notes Reviewed:  [x ] YES  [ ] NO  Care Discussed with Consultants/Other Providers [ x] YES  [ ] NO    LABS:                        10.3   10.28 )-----------( 446      ( 23 Nov 2018 08:17 )             32.0     11-22    136  |  103  |  10  ----------------------------<  120<H>  4.0   |  23  |  0.48<L>    Ca    8.1<L>      22 Nov 2018 07:10          CAPILLARY BLOOD GLUCOSE                RADIOLOGY & ADDITIONAL TESTS:    Imaging Personally Reviewed:  [x ] YES  [ ] NO

## 2018-11-23 NOTE — DIETITIAN INITIAL EVALUATION ADULT. - ENERGY NEEDS
Detail Level: Simple
HT 63 inches,  pounds,  pounds,  pounds.  BMI 24.8  Dxd with PNA, Pleural Effusion, s/p L Vat drainage.   Skin intact and very well nourished and stable nutritionally.

## 2018-11-23 NOTE — DIETITIAN INITIAL EVALUATION ADULT. - PERTINENT MEDS FT
MEDICATIONS  (STANDING):  ampicillin/sulbactam  IVPB 3 Gram(s) IV Intermittent every 6 hours  ampicillin/sulbactam  IVPB      influenza   Vaccine 0.5 milliLiter(s) IntraMuscular once  lactobacillus acidophilus 1 Tablet(s) Oral two times a day with meals    MEDICATIONS  (PRN):  acetaminophen   Tablet .. 650 milliGRAM(s) Oral every 6 hours PRN Mild Pain (1 - 3)  oxyCODONE    5 mG/acetaminophen 325 mG 1 Tablet(s) Oral every 4 hours PRN Moderate Pain (4 - 6)  oxyCODONE    5 mG/acetaminophen 325 mG 2 Tablet(s) Oral every 4 hours PRN Severe Pain (7 - 10)

## 2018-11-23 NOTE — PROGRESS NOTE ADULT - ASSESSMENT
LLL pneumonia with empyema, now postop L VATS  Continue unasyn: anticipate 4 weeks total abx: convert to oral regimen on DC per ID  CT managemt per thoracic team: probable DC anterior tube today

## 2018-11-23 NOTE — PROGRESS NOTE ADULT - PROBLEM SELECTOR PLAN 1
sp lt vats  drainage ,        2 chest tubes to   h20    will likely d/c anterior chest tube today after cxr review by Dr. Sylvia SKINNER  Ambulate  please

## 2018-11-23 NOTE — CHART NOTE - NSCHARTNOTEFT_GEN_A_CORE
Anterior chest tube removed without difficulty. Tolerated procedure well. No need for repeat cxr this evening as patient has remaining chest tube in place. Repeat cxr in AM-ordered.

## 2018-11-24 DIAGNOSIS — Z90.2 ACQUIRED ABSENCE OF LUNG [PART OF]: ICD-10-CM

## 2018-11-24 LAB
ANION GAP SERPL CALC-SCNC: 11 MMOL/L — SIGNIFICANT CHANGE UP (ref 5–17)
BUN SERPL-MCNC: 6 MG/DL — LOW (ref 7–23)
CALCIUM SERPL-MCNC: 8.4 MG/DL — SIGNIFICANT CHANGE UP (ref 8.4–10.5)
CHLORIDE SERPL-SCNC: 105 MMOL/L — SIGNIFICANT CHANGE UP (ref 96–108)
CO2 SERPL-SCNC: 24 MMOL/L — SIGNIFICANT CHANGE UP (ref 22–31)
CREAT SERPL-MCNC: 0.45 MG/DL — LOW (ref 0.5–1.3)
GAMMA INTERFERON BACKGROUND BLD IA-ACNC: 0.02 IU/ML — SIGNIFICANT CHANGE UP
GLUCOSE SERPL-MCNC: 113 MG/DL — HIGH (ref 70–99)
HCT VFR BLD CALC: 33.2 % — LOW (ref 34.5–45)
HGB BLD-MCNC: 10.6 G/DL — LOW (ref 11.5–15.5)
M TB IFN-G BLD-IMP: ABNORMAL
M TB IFN-G CD4+ BCKGRND COR BLD-ACNC: -0.01 IU/ML — SIGNIFICANT CHANGE UP
M TB IFN-G CD4+CD8+ BCKGRND COR BLD-ACNC: 0 IU/ML — SIGNIFICANT CHANGE UP
MCHC RBC-ENTMCNC: 29 PG — SIGNIFICANT CHANGE UP (ref 27–34)
MCHC RBC-ENTMCNC: 31.9 GM/DL — LOW (ref 32–36)
MCV RBC AUTO: 91 FL — SIGNIFICANT CHANGE UP (ref 80–100)
PLATELET # BLD AUTO: 471 K/UL — HIGH (ref 150–400)
POTASSIUM SERPL-MCNC: 3.6 MMOL/L — SIGNIFICANT CHANGE UP (ref 3.5–5.3)
POTASSIUM SERPL-SCNC: 3.6 MMOL/L — SIGNIFICANT CHANGE UP (ref 3.5–5.3)
QUANT TB PLUS MITOGEN MINUS NIL: 0.04 IU/ML — SIGNIFICANT CHANGE UP
RBC # BLD: 3.65 M/UL — LOW (ref 3.8–5.2)
RBC # FLD: 13.2 % — SIGNIFICANT CHANGE UP (ref 10.3–14.5)
SODIUM SERPL-SCNC: 140 MMOL/L — SIGNIFICANT CHANGE UP (ref 135–145)
WBC # BLD: 12.08 K/UL — HIGH (ref 3.8–10.5)
WBC # FLD AUTO: 12.08 K/UL — HIGH (ref 3.8–10.5)

## 2018-11-24 PROCEDURE — 71045 X-RAY EXAM CHEST 1 VIEW: CPT | Mod: 26,77

## 2018-11-24 PROCEDURE — 71045 X-RAY EXAM CHEST 1 VIEW: CPT | Mod: 26

## 2018-11-24 RX ADMIN — AMPICILLIN SODIUM AND SULBACTAM SODIUM 200 GRAM(S): 250; 125 INJECTION, POWDER, FOR SUSPENSION INTRAMUSCULAR; INTRAVENOUS at 12:21

## 2018-11-24 RX ADMIN — OXYCODONE AND ACETAMINOPHEN 2 TABLET(S): 5; 325 TABLET ORAL at 06:43

## 2018-11-24 RX ADMIN — OXYCODONE AND ACETAMINOPHEN 2 TABLET(S): 5; 325 TABLET ORAL at 02:17

## 2018-11-24 RX ADMIN — OXYCODONE AND ACETAMINOPHEN 2 TABLET(S): 5; 325 TABLET ORAL at 23:27

## 2018-11-24 RX ADMIN — Medication 1 TABLET(S): at 13:36

## 2018-11-24 RX ADMIN — OXYCODONE AND ACETAMINOPHEN 2 TABLET(S): 5; 325 TABLET ORAL at 11:54

## 2018-11-24 RX ADMIN — OXYCODONE AND ACETAMINOPHEN 2 TABLET(S): 5; 325 TABLET ORAL at 02:45

## 2018-11-24 RX ADMIN — Medication 1 TABLET(S): at 18:36

## 2018-11-24 RX ADMIN — AMPICILLIN SODIUM AND SULBACTAM SODIUM 200 GRAM(S): 250; 125 INJECTION, POWDER, FOR SUSPENSION INTRAMUSCULAR; INTRAVENOUS at 02:18

## 2018-11-24 RX ADMIN — OXYCODONE AND ACETAMINOPHEN 2 TABLET(S): 5; 325 TABLET ORAL at 11:24

## 2018-11-24 RX ADMIN — OXYCODONE AND ACETAMINOPHEN 2 TABLET(S): 5; 325 TABLET ORAL at 22:48

## 2018-11-24 RX ADMIN — OXYCODONE AND ACETAMINOPHEN 2 TABLET(S): 5; 325 TABLET ORAL at 07:02

## 2018-11-24 NOTE — PROGRESS NOTE ADULT - SUBJECTIVE AND OBJECTIVE BOX
Patient is a 53y old  Female who presents with a chief complaint of chest pain, left shoulder pain (24 Nov 2018 13:36)      INTERVAL HPI/OVERNIGHT EVENTS: noted, feels well  sp one chest tube dced yesterday, awaiting 2nd chest tube removal      Vital Signs Last 24 Hrs  T(C): 37.2 (24 Nov 2018 16:05), Max: 37.3 (23 Nov 2018 20:30)  T(F): 98.9 (24 Nov 2018 16:05), Max: 99.1 (23 Nov 2018 20:30)  HR: 75 (24 Nov 2018 16:05) (73 - 82)  BP: 128/79 (24 Nov 2018 16:05) (106/67 - 128/79)  BP(mean): --  RR: 18 (24 Nov 2018 16:05) (18 - 18)  SpO2: 100% (24 Nov 2018 16:05) (96% - 100%)    acetaminophen   Tablet .. 650 milliGRAM(s) Oral every 6 hours PRN  amoxicillin  875 milliGRAM(s)/clavulanate 1 Tablet(s) Oral two times a day  influenza   Vaccine 0.5 milliLiter(s) IntraMuscular once  lactobacillus acidophilus 1 Tablet(s) Oral two times a day with meals  oxyCODONE    5 mG/acetaminophen 325 mG 1 Tablet(s) Oral every 4 hours PRN  oxyCODONE    5 mG/acetaminophen 325 mG 2 Tablet(s) Oral every 4 hours PRN      PHYSICAL EXAM:  GENERAL: NAD,   EYES: conjunctiva and sclera clear  ENMT: Moist mucous membranes  NECK: Supple, No JVD, Normal thyroid  NERVOUS SYSTEM:  Alert & Oriented X3,   CHEST/LUNG: Clear to auscultation bilaterally; No rales, rhonchi, wheezing, or rubs  HEART: Regular rate and rhythm; No murmurs, rubs, or gallops  ABDOMEN: Soft, Nontender, Nondistended; Bowel sounds present  EXTREMITIES:  2+ Peripheral Pulses, No clubbing, cyanosis, or edema  LYMPH: No lymphadenopathy noted  SKIN: No rashes or lesions    Consultant(s) Notes Reviewed:  [x ] YES  [ ] NO  Care Discussed with Consultants/Other Providers [ x] YES  [ ] NO    LABS:                        10.6   12.08 )-----------( 471      ( 24 Nov 2018 08:29 )             33.2     11-24    140  |  105  |  6<L>  ----------------------------<  113<H>  3.6   |  24  |  0.45<L>    Ca    8.4      24 Nov 2018 06:55          CAPILLARY BLOOD GLUCOSE                RADIOLOGY & ADDITIONAL TESTS:    Imaging Personally Reviewed:  [ x] YES  [ ] NO

## 2018-11-24 NOTE — PROGRESS NOTE ADULT - ASSESSMENT
11/21  Lt vats  drainage of pleural effusion   2 chest tubes placed     11/22 chest tubes to water seal       ck  room air sats ,       minimal drainage fro chest tubes   pl cx sent from tubing  11/23: Prelim pl cx no organisim. Will review cxr today and will likely remove anterior today 52yo female with PMHx of Uterine prolapse, Thyroid nodule, Dyslipidemia, Acoustic neuroma, S/P brain surgery, and S/P partial hysterectomy who presented to [facility] ER 11/17with c/o Chest and left shoulder pain. Found to be febrile with temp of 102. Chest CT revealed: LLL consolidation c/w pneumonia and minimally loculated simple Left parapneumonic effusion. Pleural fluids finding: exudate by lights criteria. empyema with cx (+) for parvimona. ID following on Unasyn 3 gram every 6 hours.   On 11/21/18 s/p Left VATS X 2 pleural CT   Post op Course:   11/22 Left chest tubes x 2 to water seal. Pleural Culture sent from chest tubing.   11/23 Preliminary pleural cultures negative. Left anterior CT D/C.   11/24 Left posterior CT D/C’d.  Follow up CXR.  Continue with IV ABx per ID on Unasyn for now plan to transition to Augmentin for minimum end date of 12/18/18. 52yo female with PMHx of Uterine prolapse, Thyroid nodule, Dyslipidemia, Acoustic neuroma, S/P brain surgery, and S/P partial hysterectomy who presented to [facility] ER 11/17with c/o Chest and left shoulder pain. Found to be febrile with temp of 102. Chest CT revealed: LLL consolidation c/w pneumonia and minimally loculated simple Left parapneumonic effusion. 11/18 s/p IR Procedure for ultrasound-guided placement of a left 10 Swedish chest tube. Pleural fluids finding: exudate by lights criteria. Empyema with cultures (+) for moderate parvimonas species. Cytology negative for malignant cells. ID following on Unasyn 3 gram every 6 hours.   On 11/21/18 s/p Left VATS X 2 pleural CT   Post op Course:   11/22 Left chest tubes x 2 to water seal. Pleural Culture sent from chest tubing. Culture Results: No growth. Gram Stain:   11/23 Left anterior CT D/C.   11/24 Left posterior CT D/C’d.  Follow up CXR.  Continue with IV ABx per ID on Unasyn for now plan to transition to Augmentin for minimum end date of 12/18/18. 54yo female with PMHx of Uterine prolapse, Thyroid nodule, Dyslipidemia, Acoustic neuroma, S/P brain surgery, and S/P partial hysterectomy who presented to [facility] ER 11/17with c/o Chest and left shoulder pain. Found to be febrile with temp of 102. Chest CT revealed: LLL consolidation c/w pneumonia and minimally loculated simple Left parapneumonic effusion. 11/18 s/p IR Procedure for ultrasound-guided placement of a left 10 Latvian chest tube. Pleural fluids finding: exudate by lights criteria. Empyema with cultures (+) for moderate parvimonas species. Cytology negative for malignant cells. ID following on Unasyn 3 gram every 6 hours.   On 11/21/18 s/p Left VATS WITH DECORTICATION X 2 pleural CT   Post op Course:   11/22 Left chest tubes x 2 to water seal. Pleural Culture sent from chest tubing. Culture Results: No growth. Gram Stain:   11/23 Left anterior CT D/C.   11/24 Left posterior CT D/C’d.  Follow up CXR.  Continue with IV ABx per ID on Unasyn for now plan to transition to Augmentin for minimum end date of 12/18/18.

## 2018-11-24 NOTE — PROGRESS NOTE ADULT - SUBJECTIVE AND OBJECTIVE BOX
Follow-up Pulm Progress Note      POD #4 L VATS/Decortication for anerobic empyema  CT's to water seal  CXR with partial clearing L lung opacities  Pain controlled: in good spirits, without SOB  Afebrile on unasyn    Patient doing well - anxious to go home    Vital Signs Last 24 Hrs  T(C): 36.7 (23 Nov 2018 06:49), Max: 37.1 (22 Nov 2018 12:32)  T(F): 98 (23 Nov 2018 06:49), Max: 98.8 (22 Nov 2018 12:32)  HR: 73 (23 Nov 2018 06:49) (73 - 98)  BP: 101/69 (23 Nov 2018 06:49) (100/65 - 104/70)  BP(mean): --  RR: 18 (23 Nov 2018 06:49) (18 - 18)  SpO2: 96% (23 Nov 2018 06:49) (96% - 97%)                        10.3   10.28 )-----------( 446      ( 23 Nov 2018 08:17 )             32.0     11-22    136  |  103  |  10  ----------------------------<  120<H>  4.0   |  23  |  0.48<L>    Ca    8.1<L>      22 Nov 2018 07:10      CULTURES:  Culture Results:   No growth to date. (11-17 @ 21:39)  Culture Results:   No growth to date. (11-17 @ 21:39)    Most recent blood culture -- 11-18 @ 22:09   -- -- .Body Fluid Pleural Fluid 11-18 @ 22:09  Most recent blood culture -- 11-17 @ 21:39   -- -- .Blood Blood 11-17 @ 21:39      Physical Examination:  PULM:   CVS: Regular rate and rhythm, no murmurs, rubs, or gallops  ABD: Soft, non-tender  EXT:  No clubbing, cyanosis, or edema    RADIOLOGY REVIEWED  CXR:    CT chest:    TTE:

## 2018-11-24 NOTE — PROGRESS NOTE ADULT - SUBJECTIVE AND OBJECTIVE BOX
infectious diseases progress note:    PAULY GARCÍA is a 53y y. o. Female patient    Patient reports: anxious to go home    ROS:    EYES:  Negative  blurry vision or double vision  GASTROINTESTINAL:  Negative for nausea, vomiting, diarrhea  -otherwise negative except for subjective    Allergies    latex (Rash)  No Known Drug Allergies    Intolerances        ANTIBIOTICS/RELEVANT:  antimicrobials  ampicillin/sulbactam  IVPB 3 Gram(s) IV Intermittent every 6 hours  ampicillin/sulbactam  IVPB        immunologic:  influenza   Vaccine 0.5 milliLiter(s) IntraMuscular once    OTHER:  acetaminophen   Tablet .. 650 milliGRAM(s) Oral every 6 hours PRN  lactobacillus acidophilus 1 Tablet(s) Oral two times a day with meals  oxyCODONE    5 mG/acetaminophen 325 mG 1 Tablet(s) Oral every 4 hours PRN  oxyCODONE    5 mG/acetaminophen 325 mG 2 Tablet(s) Oral every 4 hours PRN      Objective:  Last 24-Vital Signs Last 24 Hrs  T(C): 36.6 (24 Nov 2018 13:25), Max: 38.8 (24 Nov 2018 13:13)  T(F): 97.8 (24 Nov 2018 13:25), Max: 101.8 (24 Nov 2018 13:13)  HR: 73 (24 Nov 2018 13:25) (73 - 82)  BP: 114/75 (24 Nov 2018 13:25) (106/67 - 151/85)  BP(mean): --  RR: 18 (24 Nov 2018 13:25) (18 - 18)  SpO2: 97% (24 Nov 2018 13:25) (93% - 98%)    T(C): 36.6 (11-24-18 @ 13:25), Max: 38.8 (11-24-18 @ 13:13)  T(F): 97.8 (11-24-18 @ 13:25), Max: 101.8 (11-24-18 @ 13:13)  T(C): 36.6 (11-24-18 @ 13:25), Max: 38.8 (11-24-18 @ 13:13)  T(F): 97.8 (11-24-18 @ 13:25), Max: 101.8 (11-24-18 @ 13:13)  T(C): 36.6 (11-24-18 @ 13:25), Max: 38.8 (11-24-18 @ 13:13)  T(F): 97.8 (11-24-18 @ 13:25), Max: 101.8 (11-24-18 @ 13:13)    PHYSICAL EXAM:  Constitutional: Well-developed, well nourished  Eyes: PERRLA, EOMI  Ear/Nose/Throat: oropharynx normal	  Neck: no JVD, no lymphadenopathy, supple  Respiratory: no accessory muscle use, decreased BS in left base  Cardiovascular: RRR, normal S1, S2 no m/r/g  Gastrointestinal: soft, NT, no HSM, BS-normal  Extremities: no clubbing, no cyanosis, edema absent  Neuro: patient alert, oriented and appropriate  Skin: no sig lesions      LABS:                        10.6   12.08 )-----------( 471      ( 24 Nov 2018 08:29 )             33.2       WBC 12.08  11-24 @ 08:29  WBC 10.28  11-23 @ 08:17  WBC 12.46  11-22 @ 08:20  WBC 16.31  11-21 @ 08:16  WBC 14.94  11-20 @ 07:53  WBC 16.25  11-19 @ 09:05  WBC 15.02  11-18 @ 11:31      11-24    140  |  105  |  6<L>  ----------------------------<  113<H>  3.6   |  24  |  0.45<L>    Ca    8.4      24 Nov 2018 06:55        Creatinine, Serum: 0.45 mg/dL (11-24-18 @ 06:55)  Creatinine, Serum: 0.48 mg/dL (11-22-18 @ 07:10)  Creatinine, Serum: 0.51 mg/dL (11-21-18 @ 06:56)  Creatinine, Serum: 0.45 mg/dL (11-20-18 @ 07:07)  Creatinine, Serum: 0.52 mg/dL (11-19-18 @ 07:26)  Creatinine, Serum: 0.63 mg/dL (11-18-18 @ 07:49)      MICROBIOLOGY:        RADIOLOGY & ADDITIONAL STUDIES: infectious diseases progress note:    PAULY GARCÍA is a 53y y. o. Female patient    Patient reports: anxious to go home    ROS:    EYES:  Negative  blurry vision or double vision  GASTROINTESTINAL:  Negative for nausea, vomiting, diarrhea  -otherwise negative except for subjective    Allergies    latex (Rash)  No Known Drug Allergies    Intolerances        ANTIBIOTICS/RELEVANT:  antimicrobials  ampicillin/sulbactam  IVPB 3 Gram(s) IV Intermittent every 6 hours  ampicillin/sulbactam  IVPB        immunologic:  influenza   Vaccine 0.5 milliLiter(s) IntraMuscular once    OTHER:  acetaminophen   Tablet .. 650 milliGRAM(s) Oral every 6 hours PRN  lactobacillus acidophilus 1 Tablet(s) Oral two times a day with meals  oxyCODONE    5 mG/acetaminophen 325 mG 1 Tablet(s) Oral every 4 hours PRN  oxyCODONE    5 mG/acetaminophen 325 mG 2 Tablet(s) Oral every 4 hours PRN      Objective:  Last 24-Vital Signs Last 24 Hrs  T(C): afebrile  HR: 73 (24 Nov 2018 13:25) (73 - 82)  BP: 114/75 (24 Nov 2018 13:25) (106/67 - 151/85)  BP(mean): --  RR: 18 (24 Nov 2018 13:25) (18 - 18)  SpO2: 97% (24 Nov 2018 13:25) (93% - 98%)    T(C): 36.6 (11-24-18 @ 13:25), Max: 38.8 (11-24-18 @ 13:13)  T(F): 97.8 (11-24-18 @ 13:25), Max: 101.8 (11-24-18 @ 13:13)  T(C): 36.6 (11-24-18 @ 13:25), Max: 38.8 (11-24-18 @ 13:13)  T(F): 97.8 (11-24-18 @ 13:25), Max: 101.8 (11-24-18 @ 13:13)  T(C): 36.6 (11-24-18 @ 13:25), Max: 38.8 (11-24-18 @ 13:13)  T(F): 97.8 (11-24-18 @ 13:25), Max: 101.8 (11-24-18 @ 13:13)    PHYSICAL EXAM:  Constitutional: Well-developed, well nourished  Eyes: PERRLA, EOMI  Ear/Nose/Throat: oropharynx normal	  Neck: no JVD, no lymphadenopathy, supple  Respiratory: no accessory muscle use, decreased BS in left base  Cardiovascular: RRR, normal S1, S2 no m/r/g  Gastrointestinal: soft, NT, no HSM, BS-normal  Extremities: no clubbing, no cyanosis, edema absent  Neuro: patient alert, oriented and appropriate  Skin: no sig lesions      LABS:                        10.6   12.08 )-----------( 471      ( 24 Nov 2018 08:29 )             33.2       WBC 12.08  11-24 @ 08:29  WBC 10.28  11-23 @ 08:17  WBC 12.46  11-22 @ 08:20  WBC 16.31  11-21 @ 08:16  WBC 14.94  11-20 @ 07:53  WBC 16.25  11-19 @ 09:05  WBC 15.02  11-18 @ 11:31      11-24    140  |  105  |  6<L>  ----------------------------<  113<H>  3.6   |  24  |  0.45<L>    Ca    8.4      24 Nov 2018 06:55        Creatinine, Serum: 0.45 mg/dL (11-24-18 @ 06:55)  Creatinine, Serum: 0.48 mg/dL (11-22-18 @ 07:10)  Creatinine, Serum: 0.51 mg/dL (11-21-18 @ 06:56)  Creatinine, Serum: 0.45 mg/dL (11-20-18 @ 07:07)  Creatinine, Serum: 0.52 mg/dL (11-19-18 @ 07:26)  Creatinine, Serum: 0.63 mg/dL (11-18-18 @ 07:49)      MICROBIOLOGY:        RADIOLOGY & ADDITIONAL STUDIES:

## 2018-11-24 NOTE — PROGRESS NOTE ADULT - ASSESSMENT
LLL pneumonia with empyema, now postop L VATS  Continue unasyn: anticipate 4 weeks total abx: convert to oral regimen on DC per ID  CT managemt per thoracic team:   from a pulmonary point of view, the patient is able to go home as soon as OK'd by thoracic surgery

## 2018-11-24 NOTE — PROGRESS NOTE ADULT - PROBLEM SELECTOR PLAN 1
empyema  Continue unasyn  d/w ID plan of care: based on anaerobic growth anticipate 4 weeks of therapy from date of decortication.   Today will change to  augmentin 875mg PO BID for minimum end date of 12/18.  If patient remains afebrile and does well may be able to discharge Sunday 11/25.    follow up with Blood cultures, Urine Legionella Ag  sp IR pigtail, empyema, pleural cx growing parvimonas  CTS  for VATS,-decortication - chest tube in place  Tylenol prn, oxycodone for severe pain  Plan to dc 2nd Chest tube today by thoracic team pending  cxr fu

## 2018-11-24 NOTE — PROGRESS NOTE ADULT - ASSESSMENT
53f with no pmh here with left chest pain, fever, leukocytosis  left pleural effusion-- s/p vats 11/20 -- exudative by lights criteria  anaerobic empyema 53f with no pmh here with left chest pain, fever, leukocytosis  left pleural effusion-- s/p vats 11/20 -- exudative by lights criteria  anaerobic empyema     today's high temp charted by error and pt remains afebrile

## 2018-11-24 NOTE — PROGRESS NOTE ADULT - PROBLEM SELECTOR PLAN 1
sp lt vats  drainage ,        2 chest tubes to   h20    will likely d/c anterior chest tube today after cxr review by Dr. Sylvia SKINNER  Ambulate  please Continue with current medical regimen as per primary team.   Left posterior pleural CT D/C'd follow up CXR.  Continue with IV ABx per ID on Unasyn for now plan to transition to Augmentin for minimum end date of 12/18/18.   Encourage Chest PT / Pulmonary toileting and Incentive spirometry every 1 hour x 10 while.   Increase activity as tolerated.   Plan of care discussed with covering attending Dr. Short.

## 2018-11-24 NOTE — PROGRESS NOTE ADULT - PROBLEM SELECTOR PLAN 1
based on anaerobic growth anticipate 4 weeks of therapy from date of decortication. Continue Unasyn for now   chest tubes removed if patient remains afebrile Monday can look at transition to augmentin 875mg PO BID for minimum end date of 12/18. based on anaerobic growth anticipate 4 weeks of therapy from date of decortication.   Today will change to  augmentin 875mg PO BID for minimum end date of 12/18.  If patient remains afebrile and does well may be able to discharge Sunday 11/25

## 2018-11-24 NOTE — PROGRESS NOTE ADULT - SUBJECTIVE AND OBJECTIVE BOX
Subjective: Pt states" I don't want to move until the tubes comes out." Reports (+) left sided pain with deep breath. Denies CP, palpitation, SOB, GILLESPIE, HA, dizziness, N/V/D, fever or chills.  No acute event noted overnight.     Vital Signs:  Vital Signs Last 24 Hrs  T(C): 37.2 (18 @ 09:17), Max: 37.3 (18 @ 20:30)  T(F): 98.9 (18 @ 09:17), Max: 99.1 (18 @ 20:30)  HR: 81 (18 @ 09:17) (76 - 82)  BP: 109/66 (18 @ 09:17) (106/67 - 115/76)  RR: 18 (18 @ 09:17) (18 - 18)  SpO2: 98% (18 @ 09:17) (96% - 98%) on (O2)    Relevant labs, radiology and Medications reviewed                        10.6   12.08 )-----------( 471      ( 2018 08:29 )             33.2     11    140  |  105  |  6<L>  ----------------------------<  113<H>  3.6   |  24  |  0.45<L>    Ca    8.4      2018 06:55    MEDICATIONS  (STANDING):  ampicillin/sulbactam  IVPB 3 Gram(s) IV Intermittent every 6 hours  ampicillin/sulbactam  IVPB      influenza   Vaccine 0.5 milliLiter(s) IntraMuscular once  lactobacillus acidophilus 1 Tablet(s) Oral two times a day with meals    MEDICATIONS  (PRN):  acetaminophen Tablet .. 650 milliGRAM(s) Oral every 6 hours PRN Mild Pain (1 - 3)  oxyCODONE  5 mG/acetaminophen 325 mG 1 Tablet(s) Oral every 4 hours PRN Moderate Pain (4 - 6)  oxyCODONE  5 mG/acetaminophen 325 mG 2 Tablet(s) Oral every 4 hours PRN Severe Pain (7 - 10)    I&O's Summary    2018 07:01  -  2018 07:00  --------------------------------------------------------  IN: 1020 mL / OUT: 50 mL / NET: 970 mL    2018 07:01  -  2018 10:42  --------------------------------------------------------  IN: 240 mL / OUT: 0 mL / NET: 240 mL    IMAGING    CXR: (. @ 08:34) Left pleural effusion and left lung linear atelectasis, unchanged. There is no pneumothorax. The cardiac silhouette size cannot be accurately assessed on this  radiograph. There is no acute abnormality of the visualized osseous structures.    Left pleural CT --> Pleural vac --> 50 cc / 24 hr     PAST MEDICAL & SURGICAL HISTORY:  Herpes zoster  Uterine prolapse  Thyroid nodule  Dyslipidemia  Acoustic neuroma  S/P partial hysterectomy  S/P brain surgery: acoutic neuromia  S/P  section:      Physical Exam:  Neurology: A&Ox3, nonfocal, BARROS x 4, NAD  Head:  Normocephalic, atraumatic  ENT:  Mucosa moist, no ulcerations  Respiratory: B/L BS CTA, diminished at bases, No wheezing, rales, rhonchi. Left posterior CT --> Pleural vac to LWS. Negative air leak. Negative subcutaneous emphysema    CV: RRR, S1S2  Abdominal: Soft, NT, ND no palpable mass  MSK: B/L (+) 1 LE edema, + peripheral pulses, FROM all 4 extremity  Incisions: intact, no erythema or drainage Subjective: Pt states" Is the tube comimg out?" Reports (+) left sided pain with deep breath. Denies CP, palpitation, SOB, GILLESPIE, HA, dizziness, N/V/D, fever or chills.  No acute event noted overnight.     Vital Signs:  Vital Signs Last 24 Hrs  T(C): 37.2 (18 @ 09:17), Max: 37.3 (18 @ 20:30)  T(F): 98.9 (18 @ 09:17), Max: 99.1 (18 @ 20:30)  HR: 81 (18 @ 09:17) (76 - 82)  BP: 109/66 (18 @ 09:17) (106/67 - 115/76)  RR: 18 (18 @ 09:17) (18 - 18)  SpO2: 98% (18 @ 09:17) (96% - 98%) on (O2)    Relevant labs, radiology and Medications reviewed                        10.6   12.08 )-----------( 471      ( 2018 08:29 )             33.2     11-    140  |  105  |  6<L>  ----------------------------<  113<H>  3.6   |  24  |  0.45<L>    Ca    8.4      2018 06:55    MEDICATIONS  (STANDING):  ampicillin/sulbactam  IVPB 3 Gram(s) IV Intermittent every 6 hours  ampicillin/sulbactam  IVPB      influenza Vaccine 0.5 milliLiter(s) IntraMuscular once  lactobacillus acidophilus 1 Tablet(s) Oral two times a day with meals    MEDICATIONS  (PRN):  acetaminophen Tablet .. 650 milliGRAM(s) Oral every 6 hours PRN Mild Pain (1 - 3)  oxyCODONE  5 mG/acetaminophen 325 mG 1 Tablet(s) Oral every 4 hours PRN Moderate Pain (4 - 6)  oxyCODONE  5 mG/acetaminophen 325 mG 2 Tablet(s) Oral every 4 hours PRN Severe Pain (7 - 10)    I&O's Summary    2018 07:01  -  2018 07:00  --------------------------------------------------------  IN: 1020 mL / OUT: 50 mL / NET: 970 mL    2018 07:  -  2018 10:42  --------------------------------------------------------  IN: 240 mL / OUT: 0 mL / NET: 240 mL    IMAGING    CXR: (18 @ 08:34) Left pleural effusion and left lung linear atelectasis, unchanged. There is no pneumothorax. The cardiac silhouette size cannot be accurately assessed on this radiograph. There is no acute abnormality of the visualized osseous structures.    Left pleural CT --> Pleural vac --> 50 cc / 24 hr     PAST MEDICAL & SURGICAL HISTORY:  Herpes zoster  Uterine prolapse  Thyroid nodule  Dyslipidemia  Acoustic neuroma  S/P partial hysterectomy  S/P brain surgery: acoutic neuromia  S/P  section:      Physical Exam:  Neurology: A&Ox3, nonfocal, BARROS x 4, NAD  Head:  Normocephalic, atraumatic  ENT:  Mucosa moist, no ulcerations  Respiratory: B/L BS CTA, diminished at bases, No wheezing, rales, rhonchi. Left posterior CT --> Pleural vac to LWS. Negative air leak. Negative subcutaneous emphysema    CV: RRR, S1S2  Abdominal: Soft, NT, ND no palpable mass  MSK: B/L (+) 1 LE edema, + peripheral pulses, FROM all 4 extremity  Incisions: intact, no erythema or drainage

## 2018-11-25 LAB
CULTURE RESULTS: SIGNIFICANT CHANGE UP
HCT VFR BLD CALC: 37.6 % — SIGNIFICANT CHANGE UP (ref 34.5–45)
HGB BLD-MCNC: 12.4 G/DL — SIGNIFICANT CHANGE UP (ref 11.5–15.5)
MCHC RBC-ENTMCNC: 29.5 PG — SIGNIFICANT CHANGE UP (ref 27–34)
MCHC RBC-ENTMCNC: 33 GM/DL — SIGNIFICANT CHANGE UP (ref 32–36)
MCV RBC AUTO: 89.5 FL — SIGNIFICANT CHANGE UP (ref 80–100)
PLATELET # BLD AUTO: 607 K/UL — HIGH (ref 150–400)
RBC # BLD: 4.2 M/UL — SIGNIFICANT CHANGE UP (ref 3.8–5.2)
RBC # FLD: 12.2 % — SIGNIFICANT CHANGE UP (ref 10.3–14.5)
SPECIMEN SOURCE: SIGNIFICANT CHANGE UP
WBC # BLD: 13.1 K/UL — HIGH (ref 3.8–10.5)
WBC # FLD AUTO: 13.1 K/UL — HIGH (ref 3.8–10.5)

## 2018-11-25 PROCEDURE — 71045 X-RAY EXAM CHEST 1 VIEW: CPT | Mod: 26,76

## 2018-11-25 RX ADMIN — OXYCODONE AND ACETAMINOPHEN 1 TABLET(S): 5; 325 TABLET ORAL at 09:43

## 2018-11-25 RX ADMIN — Medication 1 TABLET(S): at 20:42

## 2018-11-25 RX ADMIN — Medication 1 TABLET(S): at 09:42

## 2018-11-25 RX ADMIN — OXYCODONE AND ACETAMINOPHEN 1 TABLET(S): 5; 325 TABLET ORAL at 10:43

## 2018-11-25 RX ADMIN — OXYCODONE AND ACETAMINOPHEN 1 TABLET(S): 5; 325 TABLET ORAL at 20:47

## 2018-11-25 RX ADMIN — Medication 1 TABLET(S): at 18:10

## 2018-11-25 RX ADMIN — Medication 1 TABLET(S): at 09:43

## 2018-11-25 RX ADMIN — OXYCODONE AND ACETAMINOPHEN 1 TABLET(S): 5; 325 TABLET ORAL at 21:47

## 2018-11-25 NOTE — PROGRESS NOTE ADULT - PROBLEM SELECTOR PROBLEM 2
Pneumonia of left lower lobe due to infectious organism

## 2018-11-25 NOTE — PROGRESS NOTE ADULT - ASSESSMENT
52yo female with PMHx of Uterine prolapse, Thyroid nodule, Dyslipidemia, Acoustic neuroma, S/P brain surgery, and S/P partial hysterectomy who presented to [facility] ER 11/17with c/o Chest and left shoulder pain. Found to be febrile with temp of 102. Chest CT revealed: LLL consolidation c/w pneumonia and minimally loculated simple Left parapneumonic effusion. 11/18 s/p IR Procedure for ultrasound-guided placement of a left 10 Maltese chest tube. Pleural fluids finding: exudate by lights criteria. Empyema with cultures (+) for moderate parvimonas species. Cytology negative for malignant cells. ID following on Unasyn 3 gram every 6 hours.   On 11/21/18 s/p Left VATS WITH DECORTICATION X 2 pleural CT   Post op Course:   11/22 Left chest tubes x 2 to water seal. Pleural Culture sent from chest tubing. Culture Results: No growth. Gram Stain:   11/23 Left anterior CT D/C.   11/24 Left posterior CT D/C’d.  Follow up CXR.  Continue with IV ABx per ID on Unasyn for now plan to transition to Augmentin for minimum end date of 12/18/18.   11/25; cxr this am looks w little worse, + Leukocytosis. Ordered PAL cxr for today. Clinically pt looks great not alot of mucous ambulating well. Will Follow

## 2018-11-25 NOTE — PROGRESS NOTE ADULT - PROBLEM SELECTOR PROBLEM 3
Loculated pleural effusion

## 2018-11-25 NOTE — PROGRESS NOTE ADULT - PROBLEM SELECTOR PLAN 1
s/p Lvats Decort on 11/21  Chest tubes removed on Friday and Saturday  Portable CXR Today on left side looks a little worse this am with + Leukocytosis. Will get a PAL today and review-ordered. Clinically patient looks good. Will follow.

## 2018-11-25 NOTE — PROGRESS NOTE ADULT - PROBLEM SELECTOR PROBLEM 5
Thrombocytosis

## 2018-11-25 NOTE — PROGRESS NOTE ADULT - PROBLEM SELECTOR PLAN 2
Likely Community Acquired Pneumonia  see above

## 2018-11-25 NOTE — PROGRESS NOTE ADULT - ASSESSMENT
53f with no pmh here with left chest pain, fever, leukocytosis  left pleural effusion-- s/p vats 11/20 -- exudative by lights criteria  anaerobic empyema     today's high temp charted by error and pt remains afebrile

## 2018-11-25 NOTE — PROGRESS NOTE ADULT - PROBLEM SELECTOR PLAN 1
empyema  abx switched to augmentin  d/w ID plan of care: based on anaerobic growth anticipate 4 weeks of therapy from date of decortication.   Today will change to  augmentin 875mg PO BID for minimum end date of 12/18.    follow up with Blood cultures, Urine Legionella Ag  sp IR pigtail, empyema, pleural cx growing parvimonas  CTS  for VATS,-decortication - chest tube in place  Tylenol prn, oxycodone for severe pain  Plan to dc 2nd Chest tube today by thoracic team pending  cxr fu  CxR w increased pl effusion   fu thoracic team

## 2018-11-25 NOTE — PROVIDER CONTACT NOTE (OTHER) - ASSESSMENT
pt A+Ox4
pt on PCA. Pt is asymptomatic, alert, and no acute distress noted
other vss. pt with fever earlier. bc drawn with in 48hrs

## 2018-11-25 NOTE — CHART NOTE - NSCHARTNOTEFT_GEN_A_CORE
Medicine NP : CXR result     Small to moderate-sized loculated left pleural effusion with   interval increase in size since the prior study. There is left lower lung   airspace disease likely due to atelectasis. There is no definite evidence   of pneumothorax.      discussed the above  result with Sally NP (CTSx). No acute intervention at this time . Rpt CXR in am . Patient to be evaluated by CTSX attending( ) in am.

## 2018-11-25 NOTE — PROVIDER CONTACT NOTE (OTHER) - ACTION/TREATMENT ORDERED:
NP Reyes Murphy aware of above ,to place activity order.
NP Reyes Murphy aware of above, pt to receive tylenol. no addl tx at this time.
NP Reyes Murphy aware of above, to d/c order.
BC x2, UA
RAJINDER Jones states to recheck vitals at 1:00am
RAJINDER Patel states okay for patient to take medication at 9am
continue to monitor. no new orders at this time

## 2018-11-25 NOTE — PROGRESS NOTE ADULT - SUBJECTIVE AND OBJECTIVE BOX
Patient is a 53y old  Female who presents with a chief complaint of chest pain, left shoulder pain (25 Nov 2018 11:48)      INTERVAL HPI/OVERNIGHT EVENTS: noted, feels well      Vital Signs Last 24 Hrs  T(C): 37.1 (25 Nov 2018 16:13), Max: 37.7 (24 Nov 2018 19:46)  T(F): 98.8 (25 Nov 2018 16:13), Max: 99.9 (24 Nov 2018 19:46)  HR: 81 (25 Nov 2018 16:13) (76 - 89)  BP: 118/77 (25 Nov 2018 16:13) (110/70 - 118/77)  BP(mean): --  RR: 18 (25 Nov 2018 16:13) (17 - 18)  SpO2: 96% (25 Nov 2018 16:13) (95% - 97%)    acetaminophen   Tablet .. 650 milliGRAM(s) Oral every 6 hours PRN  amoxicillin  875 milliGRAM(s)/clavulanate 1 Tablet(s) Oral two times a day  influenza   Vaccine 0.5 milliLiter(s) IntraMuscular once  lactobacillus acidophilus 1 Tablet(s) Oral two times a day with meals  oxyCODONE    5 mG/acetaminophen 325 mG 1 Tablet(s) Oral every 4 hours PRN  oxyCODONE    5 mG/acetaminophen 325 mG 2 Tablet(s) Oral every 4 hours PRN      PHYSICAL EXAM:  GENERAL: NAD,   EYES: conjunctiva and sclera clear  ENMT: Moist mucous membranes  NECK: Supple, No JVD, Normal thyroid  NERVOUS SYSTEM:  Alert & Oriented X3,   CHEST/LUNG: Clear to auscultation bilaterally; No rales, rhonchi, wheezing, or rubs  HEART: Regular rate and rhythm; No murmurs, rubs, or gallops  ABDOMEN: Soft, Nontender, Nondistended; Bowel sounds present  EXTREMITIES:  2+ Peripheral Pulses, No clubbing, cyanosis, or edema  LYMPH: No lymphadenopathy noted  SKIN: No rashes or lesions    Consultant(s) Notes Reviewed:  [x ] YES  [ ] NO  Care Discussed with Consultants/Other Providers [ x] YES  [ ] NO    LABS:                        12.4   13.1  )-----------( 607      ( 25 Nov 2018 06:02 )             37.6     11-24    140  |  105  |  6<L>  ----------------------------<  113<H>  3.6   |  24  |  0.45<L>    Ca    8.4      24 Nov 2018 06:55          CAPILLARY BLOOD GLUCOSE                RADIOLOGY & ADDITIONAL TESTS:    Imaging Personally Reviewed:  [x ] YES  [ ] NO

## 2018-11-25 NOTE — PROGRESS NOTE ADULT - PROBLEM SELECTOR PLAN 4
Incentive Spirometry

## 2018-11-25 NOTE — PROGRESS NOTE ADULT - PROBLEM SELECTOR PLAN 5
Reactive process secondary to infectious etiology
Reactive process secondary to infectious etiology    dispo: for VATS

## 2018-11-25 NOTE — PROGRESS NOTE ADULT - ATTENDING COMMENTS
Autumn Akbar MD   [facility]  
Autumn Akbar MD   [facility]  
Autumn Akbar MD   [facility]  
Autumn Akbar MD   [facility]  
Dr. Abkar will take over care tomorrow 11/21/18.  Please contact with any questions or concerns [phone].
dc planning 11/25    Autumn Akbar MD   [facility]  
plan of care dw patient and NP
plan of care dw patient and Dr. Roosevelt salinas NP

## 2018-11-25 NOTE — PROVIDER CONTACT NOTE (OTHER) - NAME OF MD/NP/PA/DO NOTIFIED:
NP Cindy Reyes Murphy
Crystal Short, NP
RAJINDER Patel
RAJINDER young
Rosanna Sanderson, NP

## 2018-11-25 NOTE — PROGRESS NOTE ADULT - PROBLEM SELECTOR PLAN 1
based on anaerobic growth anticipate 4 weeks of therapy from date of decortication. Recommend  augmentin 875mg PO BID end date of 12/18.  From an ID standpoint no further requirement for inpatient status for the management of ID issues. Fine with discharge from ID standpoint when other medical issues no longer require inpatient care and social issues allow for a safe discharge plan and OK per pulmonary.    Thank you for consulting us and involving us in the management of this most interesting and challenging case.     Please Call with any further questions

## 2018-11-25 NOTE — PROVIDER CONTACT NOTE (OTHER) - DATE AND TIME:
18-Nov-2018 12:40
18-Nov-2018 12:47
18-Nov-2018 18:51
19-Nov-2018 01:02
19-Nov-2018 23:00
22-Nov-2018 00:23
25-Nov-2018 04:44

## 2018-11-25 NOTE — PROGRESS NOTE ADULT - SUBJECTIVE AND OBJECTIVE BOX
infectious diseases progress note:    PAULY GARCÍA is a 53y y. o. Female patient    Patient reports: " I need to go home before I go crazy."    ROS:    EYES:  Negative  blurry vision or double vision  GASTROINTESTINAL:  Negative for nausea, vomiting, diarrhea  -otherwise negative except for subjective    Allergies    latex (Rash)  No Known Drug Allergies    Intolerances        ANTIBIOTICS/RELEVANT:  antimicrobials  amoxicillin  875 milliGRAM(s)/clavulanate 1 Tablet(s) Oral two times a day    immunologic:  influenza   Vaccine 0.5 milliLiter(s) IntraMuscular once    OTHER:  acetaminophen   Tablet .. 650 milliGRAM(s) Oral every 6 hours PRN  lactobacillus acidophilus 1 Tablet(s) Oral two times a day with meals  oxyCODONE    5 mG/acetaminophen 325 mG 1 Tablet(s) Oral every 4 hours PRN  oxyCODONE    5 mG/acetaminophen 325 mG 2 Tablet(s) Oral every 4 hours PRN      Objective:  Last 24-Vital Signs Last 24 Hrs  T(C): 36.7 (25 Nov 2018 04:38), Max: 37.7 (24 Nov 2018 19:46)  T(F): 98.1 (25 Nov 2018 04:38), Max: 99.9 (24 Nov 2018 19:46)  HR: 76 (25 Nov 2018 04:38) (73 - 84)  BP: 118/75 (25 Nov 2018 04:38) (109/66 - 128/79)  BP(mean): --  RR: 18 (25 Nov 2018 04:38) (18 - 18)  SpO2: 96% (25 Nov 2018 04:38) (95% - 100%)    T(C): 36.7 (11-25-18 @ 04:38), Max: 37.7 (11-24-18 @ 19:46)  T(F): 98.1 (11-25-18 @ 04:38), Max: 99.9 (11-24-18 @ 19:46)  T(C): 36.7 (11-25-18 @ 04:38), Max: 37.7 (11-24-18 @ 19:46)  T(F): 98.1 (11-25-18 @ 04:38), Max: 99.9 (11-24-18 @ 19:46)  T(C): 36.7 (11-25-18 @ 04:38), Max: 37.7 (11-24-18 @ 19:46)  T(F): 98.1 (11-25-18 @ 04:38), Max: 99.9 (11-24-18 @ 19:46)    PHYSICAL EXAM:  Constitutional: Well-developed, well nourished  Eyes: PERRLA, EOMI  Ear/Nose/Throat: oropharynx normal	  Neck: no JVD, no lymphadenopathy, supple  Respiratory: no accessory muscle use, lung fields bilaterally clear except for some decreased BS in left base  Cardiovascular: RRR, normal S1, S2 no m/r/g  Gastrointestinal: soft, NT, no HSM, BS-normal  Extremities: no clubbing, no cyanosis, edema absent  Neuro: patient alert, oriented and appropriate  Skin: no sig lesions      LABS:                        12.4   13.1  )-----------( 607      ( 25 Nov 2018 06:02 )             37.6       WBC 13.1  11-25 @ 06:02  WBC 12.08  11-24 @ 08:29  WBC 10.28  11-23 @ 08:17  WBC 12.46  11-22 @ 08:20  WBC 16.31  11-21 @ 08:16  WBC 14.94  11-20 @ 07:53  WBC 16.25  11-19 @ 09:05  WBC 15.02  11-18 @ 11:31      11-24    140  |  105  |  6<L>  ----------------------------<  113<H>  3.6   |  24  |  0.45<L>    Ca    8.4      24 Nov 2018 06:55        Creatinine, Serum: 0.45 mg/dL (11-24-18 @ 06:55)  Creatinine, Serum: 0.48 mg/dL (11-22-18 @ 07:10)  Creatinine, Serum: 0.51 mg/dL (11-21-18 @ 06:56)  Creatinine, Serum: 0.45 mg/dL (11-20-18 @ 07:07)  Creatinine, Serum: 0.52 mg/dL (11-19-18 @ 07:26)                MICROBIOLOGY:              RADIOLOGY & ADDITIONAL STUDIES:

## 2018-11-25 NOTE — PROGRESS NOTE ADULT - SUBJECTIVE AND OBJECTIVE BOX
Subjective: Pt states" " Denies any CP, SOB, palpitations. No acute events overnight.    Vital Signs:  Vital Signs Last 24 Hrs  T(C): 36.7 (18 @ 11:25), Max: 37.7 (18 @ 19:46)  T(F): 98.1 (18 @ 11:25), Max: 99.9 (18 @ 19:46)  HR: 89 (18 @ 11:25) (73 - 89)  BP: 110/72 (18 @ 11:25) (110/70 - 128/79)  RR: 17 (18 @ 11:25) (17 - 18)  SpO2: 95% (18 @ 11:25) (95% - 100%) on (O2)        Relevant labs, radiology and Medications reviewed                        12.4   13.1  )-----------( 607      ( 2018 06:02 )             37.6     -    140  |  105  |  6<L>  ----------------------------<  113<H>  3.6   |  24  |  0.45<L>    Ca    8.4      2018 06:55        MEDICATIONS  (STANDING):  amoxicillin  875 milliGRAM(s)/clavulanate 1 Tablet(s) Oral two times a day  influenza   Vaccine 0.5 milliLiter(s) IntraMuscular once  lactobacillus acidophilus 1 Tablet(s) Oral two times a day with meals    MEDICATIONS  (PRN):  acetaminophen   Tablet .. 650 milliGRAM(s) Oral every 6 hours PRN Mild Pain (1 - 3)  oxyCODONE    5 mG/acetaminophen 325 mG 1 Tablet(s) Oral every 4 hours PRN Moderate Pain (4 - 6)  oxyCODONE    5 mG/acetaminophen 325 mG 2 Tablet(s) Oral every 4 hours PRN Severe Pain (7 - 10)      I&O's Summary    2018 07:01  -  2018 07:00  --------------------------------------------------------  IN: 1440 mL / OUT: 0 mL / NET: 1440 mL        IMAGING-reviewed    CXR:    CT Chest:    PAST MEDICAL & SURGICAL HISTORY:  Herpes zoster  Uterine prolapse  Thyroid nodule  Dyslipidemia  Acoustic neuroma  S/P partial hysterectomy  S/P brain surgery: acoutic neuromia  S/P  section:        Physical Exam:  Neurology: A&Ox3, nonfocal, BARROS x 4, NAD  Respiratory: B/L BS CTA, diminished at bases, No wheezing, rales, rhonchi  CV: RRR, S1S2

## 2018-11-25 NOTE — PROVIDER CONTACT NOTE (OTHER) - SITUATION
RN asking for activity order.
RN asking for clarification of potassium repletion  order. pt was given potassium earlier today.
pts /66,101,18,93% room air 101.7 rectal temp.
oral temp of 100.5, other vss. BC sent over 48hrs ago.
pt BP 90/56, order to call if systolic under 100 and diastolic under 60
pt has oral temp of 101.1 degrees F
pt requesting to push Augmentin to 9am so she has food with medication

## 2018-11-26 ENCOUNTER — TRANSCRIPTION ENCOUNTER (OUTPATIENT)
Age: 53
End: 2018-11-26

## 2018-11-26 VITALS
OXYGEN SATURATION: 97 % | SYSTOLIC BLOOD PRESSURE: 110 MMHG | HEART RATE: 88 BPM | RESPIRATION RATE: 18 BRPM | TEMPERATURE: 98 F | DIASTOLIC BLOOD PRESSURE: 75 MMHG

## 2018-11-26 LAB
ANION GAP SERPL CALC-SCNC: 9 MMOL/L — SIGNIFICANT CHANGE UP (ref 5–17)
BASOPHILS # BLD AUTO: 0.03 K/UL — SIGNIFICANT CHANGE UP (ref 0–0.2)
BASOPHILS NFR BLD AUTO: 0.2 % — SIGNIFICANT CHANGE UP (ref 0–2)
BUN SERPL-MCNC: 6 MG/DL — LOW (ref 7–23)
CALCIUM SERPL-MCNC: 8.8 MG/DL — SIGNIFICANT CHANGE UP (ref 8.4–10.5)
CHLORIDE SERPL-SCNC: 104 MMOL/L — SIGNIFICANT CHANGE UP (ref 96–108)
CO2 SERPL-SCNC: 24 MMOL/L — SIGNIFICANT CHANGE UP (ref 22–31)
CREAT SERPL-MCNC: 0.47 MG/DL — LOW (ref 0.5–1.3)
EOSINOPHIL # BLD AUTO: 0.13 K/UL — SIGNIFICANT CHANGE UP (ref 0–0.5)
EOSINOPHIL NFR BLD AUTO: 1 % — SIGNIFICANT CHANGE UP (ref 0–6)
GLUCOSE SERPL-MCNC: 123 MG/DL — HIGH (ref 70–99)
HCT VFR BLD CALC: 32.7 % — LOW (ref 34.5–45)
HGB BLD-MCNC: 10.4 G/DL — LOW (ref 11.5–15.5)
IMM GRANULOCYTES NFR BLD AUTO: 1 % — SIGNIFICANT CHANGE UP (ref 0–1.5)
LYMPHOCYTES # BLD AUTO: 1.64 K/UL — SIGNIFICANT CHANGE UP (ref 1–3.3)
LYMPHOCYTES # BLD AUTO: 13.1 % — SIGNIFICANT CHANGE UP (ref 13–44)
MCHC RBC-ENTMCNC: 28.6 PG — SIGNIFICANT CHANGE UP (ref 27–34)
MCHC RBC-ENTMCNC: 31.8 GM/DL — LOW (ref 32–36)
MCV RBC AUTO: 89.8 FL — SIGNIFICANT CHANGE UP (ref 80–100)
MONOCYTES # BLD AUTO: 0.94 K/UL — HIGH (ref 0–0.9)
MONOCYTES NFR BLD AUTO: 7.5 % — SIGNIFICANT CHANGE UP (ref 2–14)
NEUTROPHILS # BLD AUTO: 9.68 K/UL — HIGH (ref 1.8–7.4)
NEUTROPHILS NFR BLD AUTO: 77.2 % — HIGH (ref 43–77)
PLATELET # BLD AUTO: 536 K/UL — HIGH (ref 150–400)
POTASSIUM SERPL-MCNC: 3.9 MMOL/L — SIGNIFICANT CHANGE UP (ref 3.5–5.3)
POTASSIUM SERPL-SCNC: 3.9 MMOL/L — SIGNIFICANT CHANGE UP (ref 3.5–5.3)
RBC # BLD: 3.64 M/UL — LOW (ref 3.8–5.2)
RBC # FLD: 13.1 % — SIGNIFICANT CHANGE UP (ref 10.3–14.5)
SODIUM SERPL-SCNC: 137 MMOL/L — SIGNIFICANT CHANGE UP (ref 135–145)
WBC # BLD: 12.55 K/UL — HIGH (ref 3.8–10.5)
WBC # FLD AUTO: 12.55 K/UL — HIGH (ref 3.8–10.5)

## 2018-11-26 PROCEDURE — 87015 SPECIMEN INFECT AGNT CONCNTJ: CPT

## 2018-11-26 PROCEDURE — 85027 COMPLETE CBC AUTOMATED: CPT

## 2018-11-26 PROCEDURE — 80048 BASIC METABOLIC PNL TOTAL CA: CPT

## 2018-11-26 PROCEDURE — 85610 PROTHROMBIN TIME: CPT

## 2018-11-26 PROCEDURE — 80053 COMPREHEN METABOLIC PANEL: CPT

## 2018-11-26 PROCEDURE — 87116 MYCOBACTERIA CULTURE: CPT

## 2018-11-26 PROCEDURE — 85730 THROMBOPLASTIN TIME PARTIAL: CPT

## 2018-11-26 PROCEDURE — 81001 URINALYSIS AUTO W/SCOPE: CPT

## 2018-11-26 PROCEDURE — 90686 IIV4 VACC NO PRSV 0.5 ML IM: CPT

## 2018-11-26 PROCEDURE — 81003 URINALYSIS AUTO W/O SCOPE: CPT

## 2018-11-26 PROCEDURE — 87040 BLOOD CULTURE FOR BACTERIA: CPT

## 2018-11-26 PROCEDURE — 89051 BODY FLUID CELL COUNT: CPT

## 2018-11-26 PROCEDURE — 84484 ASSAY OF TROPONIN QUANT: CPT

## 2018-11-26 PROCEDURE — 88112 CYTOPATH CELL ENHANCE TECH: CPT

## 2018-11-26 PROCEDURE — 87640 STAPH A DNA AMP PROBE: CPT

## 2018-11-26 PROCEDURE — 87075 CULTR BACTERIA EXCEPT BLOOD: CPT

## 2018-11-26 PROCEDURE — 88305 TISSUE EXAM BY PATHOLOGIST: CPT

## 2018-11-26 PROCEDURE — 86480 TB TEST CELL IMMUN MEASURE: CPT

## 2018-11-26 PROCEDURE — 87102 FUNGUS ISOLATION CULTURE: CPT

## 2018-11-26 PROCEDURE — 99285 EMERGENCY DEPT VISIT HI MDM: CPT | Mod: 25

## 2018-11-26 PROCEDURE — 87205 SMEAR GRAM STAIN: CPT

## 2018-11-26 PROCEDURE — C1769: CPT

## 2018-11-26 PROCEDURE — 83615 LACTATE (LD) (LDH) ENZYME: CPT

## 2018-11-26 PROCEDURE — 86900 BLOOD TYPING SEROLOGIC ABO: CPT

## 2018-11-26 PROCEDURE — C1729: CPT

## 2018-11-26 PROCEDURE — 96375 TX/PRO/DX INJ NEW DRUG ADDON: CPT | Mod: XU

## 2018-11-26 PROCEDURE — 85379 FIBRIN DEGRADATION QUANT: CPT

## 2018-11-26 PROCEDURE — 87449 NOS EACH ORGANISM AG IA: CPT

## 2018-11-26 PROCEDURE — 84702 CHORIONIC GONADOTROPIN TEST: CPT

## 2018-11-26 PROCEDURE — 86850 RBC ANTIBODY SCREEN: CPT

## 2018-11-26 PROCEDURE — 32557 INSERT CATH PLEURA W/ IMAGE: CPT

## 2018-11-26 PROCEDURE — 84157 ASSAY OF PROTEIN OTHER: CPT

## 2018-11-26 PROCEDURE — 83690 ASSAY OF LIPASE: CPT

## 2018-11-26 PROCEDURE — 96365 THER/PROPH/DIAG IV INF INIT: CPT | Mod: XU

## 2018-11-26 PROCEDURE — 87641 MR-STAPH DNA AMP PROBE: CPT

## 2018-11-26 PROCEDURE — C1889: CPT

## 2018-11-26 PROCEDURE — 87206 SMEAR FLUORESCENT/ACID STAI: CPT

## 2018-11-26 PROCEDURE — 86901 BLOOD TYPING SEROLOGIC RH(D): CPT

## 2018-11-26 PROCEDURE — 71046 X-RAY EXAM CHEST 2 VIEWS: CPT

## 2018-11-26 PROCEDURE — 71275 CT ANGIOGRAPHY CHEST: CPT

## 2018-11-26 PROCEDURE — 87070 CULTURE OTHR SPECIMN AEROBIC: CPT

## 2018-11-26 PROCEDURE — 71045 X-RAY EXAM CHEST 1 VIEW: CPT | Mod: 26

## 2018-11-26 PROCEDURE — 71045 X-RAY EXAM CHEST 1 VIEW: CPT

## 2018-11-26 RX ADMIN — Medication 1 TABLET(S): at 08:53

## 2018-11-26 RX ADMIN — INFLUENZA VIRUS VACCINE 0.5 MILLILITER(S): 15; 15; 15; 15 SUSPENSION INTRAMUSCULAR at 14:38

## 2018-11-26 RX ADMIN — OXYCODONE AND ACETAMINOPHEN 1 TABLET(S): 5; 325 TABLET ORAL at 14:28

## 2018-11-26 NOTE — PROGRESS NOTE ADULT - SUBJECTIVE AND OBJECTIVE BOX
Vital Signs Last 24 Hrs  T(C): 36.8 (18 @ 09:15), Max: 37.3 (18 @ 20:38)  T(F): 98.2 (18 @ 09:15), Max: 99.1 (18 @ 20:38)  HR: 84 (18 @ 09:15) (76 - 84)  BP: 100/60 (18 @ 09:15) (100/60 - 118/77)  RR: 18 (18 @ 09:15) (18 - 18)  SpO2: 95% (18 @ 09:15) (95% - 98%)             @ 07:01  -   @ 07:00  --------------------------------------------------------  IN: 120 mL / OUT: 0 mL / NET: 120 mL     @ 07:01  -   @ 11:55  --------------------------------------------------------  IN: 240 mL / OUT: 0 mL / NET: 240 mL       Daily     Daily   Admit Wt: Drug Dosing Weight  Height (cm): 160.02 (2018 12:45)  Weight (kg): 63.5 (2018 12:13)  BMI (kg/m2): 24.8 (2018 12:45)  BSA (m2): 1.66 (2018 12:45)      CAPILLARY BLOOD GLUCOSE              MEDICATIONS  acetaminophen   Tablet .. 650 milliGRAM(s) Oral every 6 hours PRN  amoxicillin  875 milliGRAM(s)/clavulanate 1 Tablet(s) Oral two times a day  influenza   Vaccine 0.5 milliLiter(s) IntraMuscular once  lactobacillus acidophilus 1 Tablet(s) Oral two times a day with meals  oxyCODONE    5 mG/acetaminophen 325 mG 1 Tablet(s) Oral every 4 hours PRN  oxyCODONE    5 mG/acetaminophen 325 mG 2 Tablet(s) Oral every 4 hours PRN      PHYSICAL EXAM  Subjective: Pt states she feels much better  Neurology: alert and oriented x 3, nonfocal, no gross deficits  CV : s1, s2  Lungs: CTA B/L  Abdomen: soft, NT,ND, (+) Bowel sounds  :  voiding  Extremities:   -c/c/e    LABS  -    137  |  104  |  6<L>  ----------------------------<  123<H>  3.9   |  24  |  0.47<L>    Ca    8.8      2018 06:42                                   10.4   12.55 )-----------( 536      ( 2018 09:00 )             32.7                 PAST MEDICAL & SURGICAL HISTORY:  Herpes zoster  Uterine prolapse  Thyroid nodule  Dyslipidemia  Acoustic neuroma  S/P partial hysterectomy  S/P brain surgery: acoutic neuromia  S/P  section:

## 2018-11-26 NOTE — PROGRESS NOTE ADULT - ASSESSMENT
54yo female with PMHx of Uterine prolapse, Thyroid nodule, Dyslipidemia, Acoustic neuroma, S/P brain surgery, and S/P partial hysterectomy who presented to [facility] ER 11/17with c/o Chest and left shoulder pain. Found to be febrile with temp of 102. Chest CT revealed: LLL consolidation c/w pneumonia and minimally loculated simple Left parapneumonic effusion. 11/18 s/p IR Procedure for ultrasound-guided placement of a left 10 Vietnamese chest tube. Pleural fluids finding: exudate by lights criteria. Empyema with cultures (+) for moderate parvimonas species. Cytology negative for malignant cells. ID following on Unasyn 3 gram every 6 hours.   On 11/21/18 s/p Left VATS WITH DECORTICATION X 2 pleural CT   Post op Course:   11/22 Left chest tubes x 2 to water seal. Pleural Culture sent from chest tubing. Culture Results: No growth. Gram Stain:   11/23 Left anterior CT D/C.   11/24 Left posterior CT D/C’d.  Follow up CXR.  Continue with IV ABx per ID on Unasyn for now plan to transition to Augmentin for minimum end date of 12/18/18.   11/25; cxr this am looks w little worse, + Leukocytosis. Ordered PAL cxr for today. Clinically pt looks great not alot of mucous ambulating well. Will Follow   11/26: VSS; CXR improved this AM. Cleared from thoracic surgery standpoint. Signing off.

## 2018-11-26 NOTE — PROGRESS NOTE ADULT - PROBLEM SELECTOR PLAN 1
s/p Lvats Decort on 11/21  Chest tubes removed on Friday and Saturday   CXR improved this AM.   Cleared from thoracic surgery standpoint.   Signing off at this time. Please call with any questions.  Outpatient follow up with Dr. Ward in 2-3 weeks. Call

## 2018-11-26 NOTE — DISCHARGE NOTE ADULT - ADDITIONAL INSTRUCTIONS
Dr. Ward in 2-3 weeks. Call .   Discharge home with Augmentin 875 mg 2 times per day, end date 12/18/2018.  Follow up with PCP within 1 week  call ID Dr Quach for follow up appointment

## 2018-11-26 NOTE — DISCHARGE NOTE ADULT - MEDICATION SUMMARY - MEDICATIONS TO TAKE
I will START or STAY ON the medications listed below when I get home from the hospital:    Krill Oil  -- Indication: For Supplement    CholesteRice oral capsule  -- Indication: For Supplement    Cilium Fiber  -- Indication: For Supplement    calcium  -- Indication: For Supplement    Aspirin Enteric Coated 81 mg oral delayed release tablet  -- 1 tab(s) by mouth once a day  -- Indication: For Ppx    oxyCODONE-acetaminophen 5 mg-325 mg oral tablet  -- 1 tab(s) by mouth every 8 hours, As Needed -Moderate Pain (4 - 6) MDD:3  -- Indication: For Pain    CoQ10  -- Indication: For Supplement    amoxicillin-clavulanate 875 mg-125 mg oral tablet  -- 1 tab(s) by mouth 2 times a day  -- Indication: For Pneumonia of left lower lobe due to infectious organism    Probiotic Formula  -- Indication: For Ppx    Multiple Vitamins oral tablet  -- Indication: For Supplement    vitamin E oral capsule  -- Indication: For Supplement    Vitamin D3  -- Indication: For Supplement

## 2018-11-26 NOTE — PROGRESS NOTE ADULT - NSHPATTENDINGPLANDISCUSS_GEN_ALL_CORE
Patient, ID, thoracic surgery
Patient, medical team, thoracic surgery
Patient, medical team, thoracic surgery
mateo Malone
Dr Meyers
pt and np

## 2018-11-26 NOTE — DISCHARGE NOTE ADULT - INSTRUCTIONS
call for  follow  up  appointment  with  primary care  md   diet   meds   activity   as per md   any  severe pain nausea  vomiting  fevers   shortness breath  any  sign symptoms  of infection  reddness swelling  purulent drainage   any  problems  call md  call [facility]  EMERGENCY ROOM

## 2018-11-26 NOTE — DISCHARGE NOTE ADULT - PATIENT PORTAL LINK FT
You can access the [facility] Patient Portal, offered by [facility], by registering with the following website: [email or web address]

## 2018-11-26 NOTE — DISCHARGE NOTE ADULT - HOSPITAL COURSE
52 yo female with no prior hx, presented to the ED with worsening left sided chest pain and shoulder pain ruled out ACS in the ED negative findings on EKG and negative Troponins.  Ruled out PE with CTA Chest.  CTA Chest findings of Loculated Moderate pleural effusion, concerning for Pneumonia. Patient was admitted for Sepsis, due to unspecified organism.  Plan: -Fever of 102.3 + Leukocytosis with loculated LLL pleural effusion findings Chest CT   Pt given Ceftriaxone + Azithromycin in the ED. CTS, pulmonary and ID consulted for Left parapneumonic effusion. 11/18 s/p IR Procedure for ultrasound-guided placement of a left 10 Chinese chest tube. Pleural fluids finding: exudate by lights criteria. Empyema with cultures (+) for moderate parvimonas species. Cytology negative for malignant cells. ID following on Unasyn 3 gram every 6 hours.   On 11/21/18 s/p Left VATS WITH DECORTICATION X 2 pleural CT   Post op Course:   11/22 Left chest tubes x 2 to water seal. Pleural Culture sent from chest tubing. 11/23 Left anterior CT D/C.   11/24 Left posterior CT D/C’d.  Follow up CXR.  Continue with IV ABx per ID on Unasyn for now plan to transition to Augmentin for minimum end date of 12/18/18.   11/25; cxr this am looks w little worse, + Leukocytosis. Ordered PAL cxr for today. Clinically pt looks great not alot of mucous ambulating well. Will Follow   11/26: VSS; CXR improved this AM. Cleared from thoracic surgery standpoint. CTS Signing off.     Chest tubes removed on Friday and Saturday. Patient to follow up with Dr. Ward in 2-3 weeks. Call .   Discharge home with Augmentin 875 mg BID end date 12/18/2018.

## 2018-11-26 NOTE — DISCHARGE NOTE ADULT - CARE PLAN
Principal Discharge DX:	Pneumonia of left lower lobe due to infectious organism  Goal:	resolution of infection  Assessment and plan of treatment:	Augmentin to be taken as above end date 12/18/2018  return to hospital for fever, difficulty breathing, chest pain or worsens  Secondary Diagnosis:	Loculated pleural effusion  Assessment and plan of treatment:	plan as above  keep dressing in place today. You may remove gauze in the morning and shower per routine. Steri strip will fall off on own.   Call Dr Ward in am to schedule a follow up appointment in 2 to 3 week  Secondary Diagnosis:	Sepsis, due to unspecified organism  Goal:	resolved  Assessment and plan of treatment:	Antibiotics as above  Secondary Diagnosis:	Thrombocytosis  Assessment and plan of treatment:	your platelets today were 536. Follow up with PCP within 1 week

## 2018-11-26 NOTE — PROGRESS NOTE ADULT - ASSESSMENT
LLL pneumonia with empyema, now postop L VATS  Stable resp status: no objection to DC home today to complete total 4 weeks abx  WIll d/w thoracic team

## 2018-11-26 NOTE — DISCHARGE NOTE ADULT - CARE PROVIDER_API CALL
Lamont Guillen), Internal Medicine  [address]  [place] [address]  Phone: [phone]  Fax: [phone]    Dia Ward), Surgery; Thoracic Surgery  [address] [place] [id number]  Phone: [phone]  Fax: [phone]    Gurpreet Mendoza), Infectious Disease; Internal Medicine  [address]  [place] [address]  Phone: [phone]  Fax: [phone]

## 2018-11-26 NOTE — PROGRESS NOTE ADULT - SUBJECTIVE AND OBJECTIVE BOX
Follow-up Pulm Progress Note  Perez Albert MD  [phone]    AFebrile - feels well.  O2 sat 98% on RA  f/u CXR today improved with persitent L basilar haziness  WBC mildly elevated       Vital Signs Last 24 Hrs  T(C): 36.8 (26 Nov 2018 09:15), Max: 37.3 (25 Nov 2018 20:38)  T(F): 98.2 (26 Nov 2018 09:15), Max: 99.1 (25 Nov 2018 20:38)  HR: 84 (26 Nov 2018 09:15) (76 - 89)  BP: 100/60 (26 Nov 2018 09:15) (100/60 - 118/77)  BP(mean): --  RR: 18 (26 Nov 2018 09:15) (17 - 18)  SpO2: 95% (26 Nov 2018 09:15) (95% - 98%)                         10.4   12.55 )-----------( 536      ( 26 Nov 2018 09:00 )             32.7       11-26    137  |  104  |  6<L>  ----------------------------<  123<H>  3.9   |  24  |  0.47<L>    Ca    8.8      26 Nov 2018 06:42        Vital Signs Last 24 Hrs  T(C): 36.8 (26 Nov 2018 09:15), Max: 37.3 (25 Nov 2018 20:38)  T(F): 98.2 (26 Nov 2018 09:15), Max: 99.1 (25 Nov 2018 20:38)  HR: 84 (26 Nov 2018 09:15) (76 - 89)  BP: 100/60 (26 Nov 2018 09:15) (100/60 - 118/77)  BP(mean): --  RR: 18 (26 Nov 2018 09:15) (17 - 18)  SpO2: 95% (26 Nov 2018 09:15) (95% - 98%)                          10.4   12.55 )-----------( 536      ( 26 Nov 2018 09:00 )             32.7       11-26    137  |  104  |  6<L>  ----------------------------<  123<H>  3.9   |  24  |  0.47<L>    Ca    8.8      26 Nov 2018 06:42      CULTURES:  Culture Results:   No growth to date. (11-17 @ 21:39)  Culture Results:   No growth to date. (11-17 @ 21:39)    Most recent blood culture -- 11-18 @ 22:09   -- -- .Body Fluid Pleural Fluid 11-18 @ 22:09  Most recent blood culture -- 11-17 @ 21:39   -- -- .Blood Blood 11-17 @ 21:39      Physical Examination:  PULM: Duolness L 1/3  CVS: Regular rate and rhythm, no murmurs, rubs, or gallops  ABD: Soft, non-tender  EXT:  No clubbing, cyanosis, or edema    RADIOLOGY REVIEWED  CXR:    CT chest:    TTE:

## 2018-11-26 NOTE — DISCHARGE NOTE ADULT - PLAN OF CARE
resolution of infection Augmentin to be taken as above end date 12/18/2018  return to hospital for fever, difficulty breathing, chest pain or worsens plan as above  keep dressing in place today. You may remove gauze in the morning and shower per routine. Steri strip will fall off on own.   Call Dr Ward in am to schedule a follow up appointment in 2 to 3 week resolved Antibiotics as above your platelets today were 536. Follow up with PCP within 1 week

## 2018-11-26 NOTE — PROGRESS NOTE ADULT - PROVIDER SPECIALTY LIST ADULT
Anesthesia
CT Surgery
Infectious Disease
Infectious Disease
Internal Medicine
Intervent Radiology
Pulmonology
Thoracic Surgery
Infectious Disease
Thoracic Surgery

## 2018-11-26 NOTE — DISCHARGE NOTE ADULT - CARE PROVIDERS DIRECT ADDRESSES
,[email or web address]

## 2018-12-13 ENCOUNTER — APPOINTMENT (OUTPATIENT)
Dept: RADIOLOGY | Facility: HOSPITAL | Age: 53
End: 2018-12-13

## 2018-12-13 ENCOUNTER — OUTPATIENT (OUTPATIENT)
Dept: OUTPATIENT SERVICES | Facility: HOSPITAL | Age: 53
LOS: 1 days | End: 2018-12-13
Payer: COMMERCIAL

## 2018-12-13 ENCOUNTER — APPOINTMENT (OUTPATIENT)
Dept: THORACIC SURGERY | Facility: CLINIC | Age: 53
End: 2018-12-13
Payer: COMMERCIAL

## 2018-12-13 VITALS
WEIGHT: 134 LBS | RESPIRATION RATE: 16 BRPM | HEART RATE: 90 BPM | OXYGEN SATURATION: 99 % | SYSTOLIC BLOOD PRESSURE: 105 MMHG | DIASTOLIC BLOOD PRESSURE: 70 MMHG | TEMPERATURE: 97.8 F

## 2018-12-13 DIAGNOSIS — Z98.89 OTHER SPECIFIED POSTPROCEDURAL STATES: Chronic | ICD-10-CM

## 2018-12-13 DIAGNOSIS — J86.9 PYOTHORAX WITHOUT FISTULA: ICD-10-CM

## 2018-12-13 DIAGNOSIS — Z90.711 ACQUIRED ABSENCE OF UTERUS WITH REMAINING CERVICAL STUMP: Chronic | ICD-10-CM

## 2018-12-13 PROCEDURE — 99024 POSTOP FOLLOW-UP VISIT: CPT

## 2018-12-13 PROCEDURE — 71046 X-RAY EXAM CHEST 2 VIEWS: CPT | Mod: 26

## 2018-12-13 RX ORDER — PSYLLIUM HUSK 0.4 G
CAPSULE ORAL
Refills: 0 | Status: ACTIVE | COMMUNITY

## 2018-12-13 RX ORDER — UBIDECARENONE/VIT E ACET 100MG-5
CAPSULE ORAL
Refills: 0 | Status: ACTIVE | COMMUNITY

## 2018-12-13 RX ORDER — ASPIRIN 81 MG
81 TABLET, DELAYED RELEASE (ENTERIC COATED) ORAL
Refills: 0 | Status: ACTIVE | COMMUNITY

## 2018-12-19 ENCOUNTER — APPOINTMENT (OUTPATIENT)
Dept: CT IMAGING | Facility: CLINIC | Age: 53
End: 2018-12-19
Payer: COMMERCIAL

## 2018-12-19 ENCOUNTER — OUTPATIENT (OUTPATIENT)
Dept: OUTPATIENT SERVICES | Facility: HOSPITAL | Age: 53
LOS: 1 days | End: 2018-12-19
Payer: COMMERCIAL

## 2018-12-19 DIAGNOSIS — Z90.711 ACQUIRED ABSENCE OF UTERUS WITH REMAINING CERVICAL STUMP: Chronic | ICD-10-CM

## 2018-12-19 DIAGNOSIS — J86.9 PYOTHORAX WITHOUT FISTULA: ICD-10-CM

## 2018-12-19 DIAGNOSIS — Z98.89 OTHER SPECIFIED POSTPROCEDURAL STATES: Chronic | ICD-10-CM

## 2018-12-19 DIAGNOSIS — R91.1 SOLITARY PULMONARY NODULE: ICD-10-CM

## 2018-12-19 LAB
CULTURE RESULTS: SIGNIFICANT CHANGE UP
SPECIMEN SOURCE: SIGNIFICANT CHANGE UP

## 2018-12-19 PROCEDURE — 71250 CT THORAX DX C-: CPT

## 2018-12-19 PROCEDURE — 71250 CT THORAX DX C-: CPT | Mod: 26

## 2019-01-09 LAB
CULTURE RESULTS: SIGNIFICANT CHANGE UP
SPECIMEN SOURCE: SIGNIFICANT CHANGE UP

## 2019-03-12 ENCOUNTER — APPOINTMENT (OUTPATIENT)
Dept: ULTRASOUND IMAGING | Facility: IMAGING CENTER | Age: 54
End: 2019-03-12
Payer: COMMERCIAL

## 2019-03-12 ENCOUNTER — OUTPATIENT (OUTPATIENT)
Dept: OUTPATIENT SERVICES | Facility: HOSPITAL | Age: 54
LOS: 1 days | End: 2019-03-12
Payer: COMMERCIAL

## 2019-03-12 ENCOUNTER — APPOINTMENT (OUTPATIENT)
Dept: MAMMOGRAPHY | Facility: IMAGING CENTER | Age: 54
End: 2019-03-12
Payer: COMMERCIAL

## 2019-03-12 DIAGNOSIS — Z98.89 OTHER SPECIFIED POSTPROCEDURAL STATES: Chronic | ICD-10-CM

## 2019-03-12 DIAGNOSIS — Z90.711 ACQUIRED ABSENCE OF UTERUS WITH REMAINING CERVICAL STUMP: Chronic | ICD-10-CM

## 2019-03-12 DIAGNOSIS — Z00.8 ENCOUNTER FOR OTHER GENERAL EXAMINATION: ICD-10-CM

## 2019-03-12 PROCEDURE — 77067 SCR MAMMO BI INCL CAD: CPT

## 2019-03-12 PROCEDURE — 77067 SCR MAMMO BI INCL CAD: CPT | Mod: 26

## 2019-03-12 PROCEDURE — 76641 ULTRASOUND BREAST COMPLETE: CPT | Mod: 26,50

## 2019-03-12 PROCEDURE — 77063 BREAST TOMOSYNTHESIS BI: CPT

## 2019-03-12 PROCEDURE — 77063 BREAST TOMOSYNTHESIS BI: CPT | Mod: 26

## 2019-03-12 PROCEDURE — 76641 ULTRASOUND BREAST COMPLETE: CPT

## 2019-03-21 ENCOUNTER — OUTPATIENT (OUTPATIENT)
Dept: OUTPATIENT SERVICES | Facility: HOSPITAL | Age: 54
LOS: 1 days | End: 2019-03-21
Payer: COMMERCIAL

## 2019-03-21 ENCOUNTER — APPOINTMENT (OUTPATIENT)
Dept: THORACIC SURGERY | Facility: CLINIC | Age: 54
End: 2019-03-21
Payer: COMMERCIAL

## 2019-03-21 ENCOUNTER — APPOINTMENT (OUTPATIENT)
Dept: RADIOLOGY | Facility: HOSPITAL | Age: 54
End: 2019-03-21

## 2019-03-21 VITALS
DIASTOLIC BLOOD PRESSURE: 74 MMHG | WEIGHT: 142 LBS | TEMPERATURE: 98 F | SYSTOLIC BLOOD PRESSURE: 110 MMHG | HEART RATE: 82 BPM | RESPIRATION RATE: 18 BRPM | OXYGEN SATURATION: 99 %

## 2019-03-21 DIAGNOSIS — Z98.89 OTHER SPECIFIED POSTPROCEDURAL STATES: Chronic | ICD-10-CM

## 2019-03-21 DIAGNOSIS — Z98.890 OTHER SPECIFIED POSTPROCEDURAL STATES: ICD-10-CM

## 2019-03-21 DIAGNOSIS — Z87.898 PERSONAL HISTORY OF OTHER SPECIFIED CONDITIONS: ICD-10-CM

## 2019-03-21 DIAGNOSIS — J90 PLEURAL EFFUSION, NOT ELSEWHERE CLASSIFIED: ICD-10-CM

## 2019-03-21 DIAGNOSIS — R91.1 SOLITARY PULMONARY NODULE: ICD-10-CM

## 2019-03-21 DIAGNOSIS — Z90.711 ACQUIRED ABSENCE OF UTERUS WITH REMAINING CERVICAL STUMP: Chronic | ICD-10-CM

## 2019-03-21 DIAGNOSIS — J86.9 PYOTHORAX WITHOUT FISTULA: ICD-10-CM

## 2019-03-21 DIAGNOSIS — J86.9 PYOTHORAX W/OUT FISTULA: ICD-10-CM

## 2019-03-21 PROCEDURE — 71046 X-RAY EXAM CHEST 2 VIEWS: CPT | Mod: 26

## 2019-03-21 PROCEDURE — 99214 OFFICE O/P EST MOD 30 MIN: CPT

## 2019-03-21 NOTE — CONSULT LETTER
[Dear  ___] : Dear  [unfilled], [Courtesy Letter:] : I had the pleasure of seeing your patient, [unfilled], in my office today. [Please see my note below.] : Please see my note below. [Sincerely,] : Sincerely, [FreeTextEntry2] : Dr. Lamont Guillen  [FreeTextEntry3] : Dia Ward MD\par Attending Surgeon\par Division of Thoracic Surgery\par , [facility] [employer] [facility]\par \par

## 2019-03-21 NOTE — HISTORY OF PRESENT ILLNESS
[FreeTextEntry1] : 52 yo female with no prior Hx, presented to the ED with worsening left sided chest pain and shoulder pain ruled out ACS in the ED negative findings on EKG and negative Troponins. Ruled out PE with CTA Chest. CTA Chest findings of Loculated Moderate pleural effusion, concerning for Pneumonia. Patient was admitted for Sepsis, due to unspecified organism. Plan: -Fever of 102.3 + Leukocytosis with loculated LLL pleural effusion findings Chest CT Pt given Ceftriaxone + Azithromycin in the ED.\par \par  On 11/18 s/p IR Procedure for ultrasound-guided placement of a left chest tube. Pleural fluids finding: exudate by lights criteria. Empyema with cultures (+) for moderate parvimonas species. Cytology negative for malignant cells. ID following on Unasyn 3 gram every 6 hours. \par \par On 11/21/18 s/p Left VATS; Decortication X 2 pleural CT Post op Course: 11/22 Left chest tubes x 2 to water seal. Pleural Culture sent from chest tubing. 11/23 Left anterior CT D/C. 11/24 Left posterior CT D/Cd. Continued with IV ABx per ID on Unasyn then transitioned to Augmentin till of 12/18/18. \par \par CXR 12/13/18: A 1.1 cm circumscribed nodule overlying the right lower chest on the PA image may correspond to the nipple. A small loculated left pleural effusion appears slightly decreased in size. There is likely associated passive atelectasis. Linear atelectasis is again seen in the left mid to lower lung. \par \par CXR today on 3/21/19: Left costophrenic angle blunting which could be due to pleural thickening or residual trace to small left pleural effusion. No pneumothorax. 1.1 cm nodular density projecting over the lower left chest on the PA image which may correspond to the nipple. \par \par Pt presents today for follow up. The patient denies cough, chest pain, hemoptysis, palpitation, fever, recent illness, hospitalization and significant weight loss. She admits SOB on exertion occasionally. \par

## 2019-03-21 NOTE — DATA REVIEWED
[FreeTextEntry1] : CXR today on 3/21/19: Left costophrenic angle blunting which could be due to pleural thickening or residual trace to small left pleural effusion. No pneumothorax. 1.1 cm nodular density projecting over the lower left chest on the PA image which may correspond to the nipple.

## 2019-03-21 NOTE — PHYSICAL EXAM
[Sclera] : the sclera and conjunctiva were normal [PERRL With Normal Accommodation] : pupils were equal in size, round, and reactive to light [Neck Appearance] : the appearance of the neck was normal [Respiration, Rhythm And Depth] : normal respiratory rhythm and effort [Auscultation Breath Sounds / Voice Sounds] : lungs were clear to auscultation bilaterally [Heart Rate And Rhythm] : heart rate was normal and rhythm regular [Heart Sounds] : normal S1 and S2 [Examination Of The Chest] : the chest was normal in appearance [2+] : left 2+ [No Abnormalities] : the abdominal aorta was not enlarged and no bruit was heard [No Pulse Delay] : no pulse delay [No Pulse Discrepancy] : no pulse discrepancy detected [Full Pulse] : peripheral pulses were full [Bowel Sounds] : normal bowel sounds [Abdomen Soft] : soft [Abdomen Tenderness] : non-tender [Cervical Lymph Nodes Enlarged Posterior Bilaterally] : posterior cervical [Cervical Lymph Nodes Enlarged Anterior Bilaterally] : anterior cervical [No CVA Tenderness] : no ~M costovertebral angle tenderness [Abnormal Walk] : normal gait [Involuntary Movements] : no involuntary movements were seen [Skin Color & Pigmentation] : normal skin color and pigmentation [Skin Turgor] : normal skin turgor [] : no rash [No Focal Deficits] : no focal deficits [Oriented To Time, Place, And Person] : oriented to person, place, and time [Affect] : the affect was normal [Mood] : the mood was normal [Fingers] :  capillary refill of the fingers was normal [Varicose Veins Of The Right Leg] : the patient has no varicose veins of the right leg [Varicose Veins Of The Left Leg] : the patient has no varicose veins of the left leg [FreeTextEntry1] : deferred

## 2019-03-21 NOTE — ASSESSMENT
[FreeTextEntry1] : 54 y/o female with empyema, s/p Left VATS; Decortication X 2 pleural CT Post op Course: 11/22 Left chest tubes x 2 to water seal on 11/21/18. \par CXR 12/13/18: A 1.1 cm circumscribed nodule overlying the right lower chest on the PA image may correspond to the nipple. A small loculated left pleural effusion appears slightly decreased in size. There is likely associated passive atelectasis. Linear atelectasis is again seen in the left mid to lower lung. \par \par CXR today on 3/21/19: Left costophrenic angle blunting which could be due to pleural thickening or residual trace to small left pleural effusion. No pneumothorax. 1.1 cm nodular density projecting over the lower left chest on the PA image which may correspond to the nipple. \par \par She admits SOB on exertion occasionally, otherwise feeling well. Denies chest pain, cough, fever, chills and recent illness. \par \par I have reviewed the patient's medical records and diagnostic images at the time of this office consultation and have made the following recommendation.\par Plan:\par 1. RTC prn. \par \par \par \par Written by Addie Rojas NP, acting as a scribe for Dr. Dia Ward.     \par “The documentation recorded by the scribe accurately reflects the service I personally performed and the decisions made by me.” Dia Ward MD.\par \par

## 2019-06-03 NOTE — PROGRESS NOTE ADULT - REASON FOR ADMISSION
chest pain, left shoulder pain
sp lt vats decort drainge of effusion
chest pain, left shoulder pain
,DirectAddress_Unknown
sp lt vats drainage of pleural effusion
chest pain, left shoulder pain

## 2019-11-06 ENCOUNTER — RESULT REVIEW (OUTPATIENT)
Age: 54
End: 2019-11-06

## 2019-11-06 ENCOUNTER — FORM ENCOUNTER (OUTPATIENT)
Age: 54
End: 2019-11-06

## 2019-11-07 ENCOUNTER — APPOINTMENT (OUTPATIENT)
Dept: OBGYN | Facility: CLINIC | Age: 54
End: 2019-11-07
Payer: COMMERCIAL

## 2019-11-07 PROCEDURE — 99396 PREV VISIT EST AGE 40-64: CPT

## 2020-03-10 ENCOUNTER — TRANSCRIPTION ENCOUNTER (OUTPATIENT)
Age: 55
End: 2020-03-10

## 2020-12-23 ENCOUNTER — FORM ENCOUNTER (OUTPATIENT)
Age: 55
End: 2020-12-23

## 2020-12-24 ENCOUNTER — APPOINTMENT (OUTPATIENT)
Dept: OBGYN | Facility: CLINIC | Age: 55
End: 2020-12-24
Payer: COMMERCIAL

## 2020-12-24 ENCOUNTER — RESULT REVIEW (OUTPATIENT)
Age: 55
End: 2020-12-24

## 2020-12-24 VITALS
WEIGHT: 142 LBS | SYSTOLIC BLOOD PRESSURE: 100 MMHG | HEIGHT: 55 IN | BODY MASS INDEX: 32.86 KG/M2 | DIASTOLIC BLOOD PRESSURE: 60 MMHG

## 2020-12-24 DIAGNOSIS — R92.2 INCONCLUSIVE MAMMOGRAM: ICD-10-CM

## 2020-12-24 PROCEDURE — 99072 ADDL SUPL MATRL&STAF TM PHE: CPT

## 2020-12-24 PROCEDURE — 99396 PREV VISIT EST AGE 40-64: CPT

## 2020-12-29 ENCOUNTER — FORM ENCOUNTER (OUTPATIENT)
Age: 55
End: 2020-12-29

## 2021-04-06 ENCOUNTER — FORM ENCOUNTER (OUTPATIENT)
Age: 56
End: 2021-04-06

## 2021-04-28 ENCOUNTER — RESULT REVIEW (OUTPATIENT)
Age: 56
End: 2021-04-28

## 2021-04-28 ENCOUNTER — FORM ENCOUNTER (OUTPATIENT)
Age: 56
End: 2021-04-28

## 2021-04-28 ENCOUNTER — APPOINTMENT (OUTPATIENT)
Dept: MAMMOGRAPHY | Facility: IMAGING CENTER | Age: 56
End: 2021-04-28
Payer: COMMERCIAL

## 2021-04-28 ENCOUNTER — APPOINTMENT (OUTPATIENT)
Dept: ULTRASOUND IMAGING | Facility: IMAGING CENTER | Age: 56
End: 2021-04-28
Payer: COMMERCIAL

## 2021-04-28 ENCOUNTER — OUTPATIENT (OUTPATIENT)
Dept: OUTPATIENT SERVICES | Facility: HOSPITAL | Age: 56
LOS: 1 days | End: 2021-04-28
Payer: COMMERCIAL

## 2021-04-28 DIAGNOSIS — Z00.8 ENCOUNTER FOR OTHER GENERAL EXAMINATION: ICD-10-CM

## 2021-04-28 DIAGNOSIS — Z98.89 OTHER SPECIFIED POSTPROCEDURAL STATES: Chronic | ICD-10-CM

## 2021-04-28 DIAGNOSIS — Z90.711 ACQUIRED ABSENCE OF UTERUS WITH REMAINING CERVICAL STUMP: Chronic | ICD-10-CM

## 2021-04-28 PROCEDURE — 77067 SCR MAMMO BI INCL CAD: CPT

## 2021-04-28 PROCEDURE — 76641 ULTRASOUND BREAST COMPLETE: CPT | Mod: 26,50

## 2021-04-28 PROCEDURE — 77063 BREAST TOMOSYNTHESIS BI: CPT | Mod: 26

## 2021-04-28 PROCEDURE — 77063 BREAST TOMOSYNTHESIS BI: CPT

## 2021-04-28 PROCEDURE — 77067 SCR MAMMO BI INCL CAD: CPT | Mod: 26

## 2021-04-28 PROCEDURE — 76641 ULTRASOUND BREAST COMPLETE: CPT

## 2021-08-10 NOTE — PATIENT PROFILE ADULT - NSPROGENSOURCEINFO_GEN_A_NUR
Alert-The patient is alert, awake and responds to voice. The patient is oriented to time, place, and person. The triage nurse is able to obtain subjective information.
patient

## 2021-11-18 NOTE — DISCHARGE NOTE ADULT - MEDICATION SUMMARY - MEDICATIONS TO STOP TAKING
EMB result from 11/17/21 came back negative for ACR/IF for C4d/C3 per Dr. Balbuena. DSA result pending . Labs addressed yesterday. Relayed result to Dr. Ortega. Plan to 1) continue with current IS regimen, 2) weekly labs as scheduled, 3) RHC/EMB on 12//23/21. Called and discussed plan of care with the patient. Able to verbalized understanding of such and rpt instructions provided.    I will STOP taking the medications listed below when I get home from the hospital:    ibuprofen 800 mg oral tablet  -- 1 tab(s) by mouth every 8 hours

## 2022-01-31 PROBLEM — R92.2 DENSE BREASTS: Status: ACTIVE | Noted: 2022-01-31

## 2022-02-01 DIAGNOSIS — Z98.890 OTHER SPECIFIED POSTPROCEDURAL STATES: ICD-10-CM

## 2022-02-01 DIAGNOSIS — Z78.0 ASYMPTOMATIC MENOPAUSAL STATE: ICD-10-CM

## 2022-02-01 DIAGNOSIS — N81.9 FEMALE GENITAL PROLAPSE, UNSPECIFIED: ICD-10-CM

## 2022-02-01 DIAGNOSIS — R19.00 INTRA-ABDOMINAL AND PELVIC SWELLING, MASS AND LUMP, UNSPECIFIED SITE: ICD-10-CM

## 2022-02-01 DIAGNOSIS — Z78.9 OTHER SPECIFIED HEALTH STATUS: ICD-10-CM

## 2022-02-01 DIAGNOSIS — Z92.89 PERSONAL HISTORY OF OTHER MEDICAL TREATMENT: ICD-10-CM

## 2022-02-16 NOTE — ED ADULT NURSE NOTE - BOWEL SOUNDS RLQ
C/o pain and swelling to the ball of his right foot for about 2 weeks.  For the last 2 days the top of his foot has been painful.  No injury.    present

## 2022-05-05 ENCOUNTER — OUTPATIENT (OUTPATIENT)
Dept: OUTPATIENT SERVICES | Facility: HOSPITAL | Age: 57
LOS: 1 days | End: 2022-05-05
Payer: COMMERCIAL

## 2022-05-05 ENCOUNTER — RESULT REVIEW (OUTPATIENT)
Age: 57
End: 2022-05-05

## 2022-05-05 ENCOUNTER — APPOINTMENT (OUTPATIENT)
Dept: MAMMOGRAPHY | Facility: IMAGING CENTER | Age: 57
End: 2022-05-05
Payer: COMMERCIAL

## 2022-05-05 ENCOUNTER — APPOINTMENT (OUTPATIENT)
Dept: ULTRASOUND IMAGING | Facility: IMAGING CENTER | Age: 57
End: 2022-05-05
Payer: COMMERCIAL

## 2022-05-05 DIAGNOSIS — Z98.89 OTHER SPECIFIED POSTPROCEDURAL STATES: Chronic | ICD-10-CM

## 2022-05-05 DIAGNOSIS — Z90.711 ACQUIRED ABSENCE OF UTERUS WITH REMAINING CERVICAL STUMP: Chronic | ICD-10-CM

## 2022-05-05 DIAGNOSIS — Z00.8 ENCOUNTER FOR OTHER GENERAL EXAMINATION: ICD-10-CM

## 2022-05-05 PROCEDURE — 76641 ULTRASOUND BREAST COMPLETE: CPT

## 2022-05-05 PROCEDURE — 77067 SCR MAMMO BI INCL CAD: CPT

## 2022-05-05 PROCEDURE — 76641 ULTRASOUND BREAST COMPLETE: CPT | Mod: 26,50

## 2022-05-05 PROCEDURE — 77063 BREAST TOMOSYNTHESIS BI: CPT

## 2022-05-05 PROCEDURE — 77063 BREAST TOMOSYNTHESIS BI: CPT | Mod: 26

## 2022-05-05 PROCEDURE — 77067 SCR MAMMO BI INCL CAD: CPT | Mod: 26

## 2022-05-18 ENCOUNTER — APPOINTMENT (OUTPATIENT)
Dept: OBGYN | Facility: CLINIC | Age: 57
End: 2022-05-18
Payer: COMMERCIAL

## 2022-05-18 VITALS
BODY MASS INDEX: 25.34 KG/M2 | DIASTOLIC BLOOD PRESSURE: 70 MMHG | RESPIRATION RATE: 17 BRPM | WEIGHT: 143 LBS | OXYGEN SATURATION: 98 % | SYSTOLIC BLOOD PRESSURE: 107 MMHG | HEIGHT: 63 IN | HEART RATE: 72 BPM

## 2022-05-18 DIAGNOSIS — Z12.39 ENCOUNTER FOR OTHER SCREENING FOR MALIGNANT NEOPLASM OF BREAST: ICD-10-CM

## 2022-05-18 PROCEDURE — 99396 PREV VISIT EST AGE 40-64: CPT

## 2022-05-18 NOTE — END OF VISIT
[FreeTextEntry3] : I, Caro Foreman, acted as a scribe on behalf of Dr. Abby Elkins on 05/18/2022. \par \par All medical entries made by the scribe where at my, Dr. Abby Elkins, direction and personally dictated by me on 05/18/2022. I have reviewed the chart and agree that the record accurately reflects my personal performance of the history, physical exam, assessment, and plan. I have also personally directed, reviewed and agreed with the chart.\par

## 2022-05-18 NOTE — PLAN
[FreeTextEntry1] : 56 yo , annual exam.\par \par HCM\par -pap done today\par -vit D/exercise/BMD f/u pcp for osteo screening\par -breast mammo/sono utd.\par -colon screening reviewed\par -f/u PCP for annual and appropriate immunizations\par -rto 1 year for annual

## 2022-05-18 NOTE — HISTORY OF PRESENT ILLNESS
[Patient reported PAP Smear was normal] : Patient reported PAP Smear was normal [FreeTextEntry1] : 56 yo , presents for annual exam. She feels well and offers no complaints.\par \par OBHx:  x1, c/s x1, TOP x1\par GYNHx: prolapse\par SHx: brain surgery 10 yrs ago rsection of Neuroma benign, c/s x1, breast biopsy, hysterectomy (P) 2013\par Allergies: Latex sensitivity\par  [Mammogramdate] : 5/2022 [TextBox_19] : BIRADS 2 [BreastSonogramDate] : 5/2022 [TextBox_25] : BIRADS 2 [PapSmeardate] : 12/2020

## 2022-05-20 LAB — HPV HIGH+LOW RISK DNA PNL CVX: NOT DETECTED

## 2022-05-23 LAB — CYTOLOGY CVX/VAG DOC THIN PREP: ABNORMAL

## 2023-03-01 ENCOUNTER — APPOINTMENT (OUTPATIENT)
Dept: OTOLARYNGOLOGY | Facility: CLINIC | Age: 58
End: 2023-03-01

## 2023-03-10 ENCOUNTER — APPOINTMENT (OUTPATIENT)
Dept: DERMATOLOGY | Facility: CLINIC | Age: 58
End: 2023-03-10
Payer: COMMERCIAL

## 2023-03-10 VITALS — WEIGHT: 138 LBS | HEIGHT: 63 IN | BODY MASS INDEX: 24.45 KG/M2

## 2023-03-10 DIAGNOSIS — D18.01 HEMANGIOMA OF SKIN AND SUBCUTANEOUS TISSUE: ICD-10-CM

## 2023-03-10 DIAGNOSIS — L71.9 ROSACEA, UNSPECIFIED: ICD-10-CM

## 2023-03-10 DIAGNOSIS — L81.1 CHLOASMA: ICD-10-CM

## 2023-03-10 DIAGNOSIS — L30.9 DERMATITIS, UNSPECIFIED: ICD-10-CM

## 2023-03-10 DIAGNOSIS — D22.9 MELANOCYTIC NEVI, UNSPECIFIED: ICD-10-CM

## 2023-03-10 PROCEDURE — 99203 OFFICE O/P NEW LOW 30 MIN: CPT

## 2023-04-06 ENCOUNTER — NON-APPOINTMENT (OUTPATIENT)
Age: 58
End: 2023-04-06

## 2023-04-07 LAB
ANA SER IF-ACNC: NEGATIVE
ENA RNP AB SER IA-ACNC: <0.2 AL
ENA SCL70 IGG SER IA-ACNC: <0.2 AL
ENA SM AB SER IA-ACNC: <0.2 AL
RHEUMATOID FACT SER QL: 10 IU/ML

## 2023-04-24 PROBLEM — Z12.39 ENCOUNTER FOR SCREENING BREAST EXAMINATION: Status: ACTIVE | Noted: 2022-01-31

## 2023-05-08 ENCOUNTER — APPOINTMENT (OUTPATIENT)
Dept: ULTRASOUND IMAGING | Facility: IMAGING CENTER | Age: 58
End: 2023-05-08
Payer: COMMERCIAL

## 2023-05-08 ENCOUNTER — RESULT REVIEW (OUTPATIENT)
Age: 58
End: 2023-05-08

## 2023-05-08 ENCOUNTER — OUTPATIENT (OUTPATIENT)
Dept: OUTPATIENT SERVICES | Facility: HOSPITAL | Age: 58
LOS: 1 days | End: 2023-05-08
Payer: COMMERCIAL

## 2023-05-08 ENCOUNTER — APPOINTMENT (OUTPATIENT)
Dept: MAMMOGRAPHY | Facility: IMAGING CENTER | Age: 58
End: 2023-05-08
Payer: COMMERCIAL

## 2023-05-08 DIAGNOSIS — Z98.89 OTHER SPECIFIED POSTPROCEDURAL STATES: Chronic | ICD-10-CM

## 2023-05-08 DIAGNOSIS — R92.2 INCONCLUSIVE MAMMOGRAM: ICD-10-CM

## 2023-05-08 DIAGNOSIS — Z12.39 ENCOUNTER FOR OTHER SCREENING FOR MALIGNANT NEOPLASM OF BREAST: ICD-10-CM

## 2023-05-08 DIAGNOSIS — Z00.8 ENCOUNTER FOR OTHER GENERAL EXAMINATION: ICD-10-CM

## 2023-05-08 DIAGNOSIS — Z90.711 ACQUIRED ABSENCE OF UTERUS WITH REMAINING CERVICAL STUMP: Chronic | ICD-10-CM

## 2023-05-08 PROCEDURE — 77063 BREAST TOMOSYNTHESIS BI: CPT | Mod: 26

## 2023-05-08 PROCEDURE — 77063 BREAST TOMOSYNTHESIS BI: CPT

## 2023-05-08 PROCEDURE — 76641 ULTRASOUND BREAST COMPLETE: CPT | Mod: 26,50

## 2023-05-08 PROCEDURE — 76641 ULTRASOUND BREAST COMPLETE: CPT

## 2023-05-08 PROCEDURE — 77067 SCR MAMMO BI INCL CAD: CPT | Mod: 26

## 2023-05-08 PROCEDURE — 77067 SCR MAMMO BI INCL CAD: CPT

## 2023-05-25 ENCOUNTER — APPOINTMENT (OUTPATIENT)
Dept: OBGYN | Facility: CLINIC | Age: 58
End: 2023-05-25
Payer: COMMERCIAL

## 2023-05-25 VITALS
SYSTOLIC BLOOD PRESSURE: 123 MMHG | BODY MASS INDEX: 24.8 KG/M2 | DIASTOLIC BLOOD PRESSURE: 72 MMHG | WEIGHT: 140 LBS | HEIGHT: 63 IN

## 2023-05-25 DIAGNOSIS — Z01.419 ENCOUNTER FOR GYNECOLOGICAL EXAMINATION (GENERAL) (ROUTINE) W/OUT ABNORMAL FINDINGS: ICD-10-CM

## 2023-05-25 PROCEDURE — 99396 PREV VISIT EST AGE 40-64: CPT

## 2023-05-25 NOTE — HISTORY OF PRESENT ILLNESS
[Patient reported mammogram was normal] : Patient reported mammogram was normal [Patient reported breast sonogram was normal] : Patient reported breast sonogram was normal [Patient reported PAP Smear was normal] : Patient reported PAP Smear was normal [FreeTextEntry1] : 57 yo female pt  present for an annual wellness visit. She reports still having menopause symptoms. The pt is well and has no complaints. \par \par OBHx:  x1, c/s x1, TOP x1\par GYNHx: prolapse\par SHx: Brain Surgery, c/s x1, breast biopsy, hysterectomy ()\par FHx: Breast Cancer (Sister)\par Allergies: Latex \par  [TextBox_4] : Annual [Mammogramdate] : 05/23 [TextBox_19] : birads 2  [BreastSonogramDate] : 05/23 [TextBox_25] : birads 2 [PapSmeardate] : 5/2022 [LMPDate] : 2013

## 2023-05-25 NOTE — PHYSICAL EXAM
[Appropriately responsive] : appropriately responsive [Alert] : alert [No Acute Distress] : no acute distress [No Lymphadenopathy] : no lymphadenopathy [Soft] : soft [Non-tender] : non-tender [Non-distended] : non-distended [No HSM] : No HSM [No Lesions] : no lesions [No Mass] : no mass [Oriented x3] : oriented x3 [Examination Of The Breasts] : a normal appearance [No Masses] : no breast masses were palpable [Labia Majora] : normal [Labia Minora] : normal [Absent] : absent [Normal] : normal [Uterine Adnexae] : normal

## 2023-05-25 NOTE — PLAN
[FreeTextEntry1] : 57 yo female pt present for an annual wellness exam\par \par HCM\par -pap done today \par -breast mammo/ sono up to date\par -f/u PCP for annual and appropriate immunizations\par -rto 1 year

## 2023-05-30 LAB — HPV HIGH+LOW RISK DNA PNL CVX: NOT DETECTED

## 2023-06-01 LAB — CYTOLOGY CVX/VAG DOC THIN PREP: ABNORMAL

## 2023-08-29 ENCOUNTER — NON-APPOINTMENT (OUTPATIENT)
Age: 58
End: 2023-08-29

## 2023-08-31 ENCOUNTER — APPOINTMENT (OUTPATIENT)
Dept: ORTHOPEDIC SURGERY | Facility: CLINIC | Age: 58
End: 2023-08-31
Payer: COMMERCIAL

## 2023-08-31 VITALS
SYSTOLIC BLOOD PRESSURE: 114 MMHG | HEIGHT: 63 IN | BODY MASS INDEX: 24.8 KG/M2 | HEART RATE: 70 BPM | WEIGHT: 140 LBS | DIASTOLIC BLOOD PRESSURE: 75 MMHG

## 2023-08-31 PROCEDURE — 99203 OFFICE O/P NEW LOW 30 MIN: CPT

## 2023-08-31 NOTE — HISTORY OF PRESENT ILLNESS
[de-identified] : This 58-year-old woman who 4-1/2 years ago was hospitalized with left pleural effusion and an empyema the symptoms of which fully resolved late last year developed a feeling of difficulty taking a full breath.  She has seen her primary care physician, an allergist, pulmonologist with full testing, a cardiologist and her GI doctor without an answer.  She does have a history of anxiety.  She has become fearful of getting out of the house or driving.  She describes an episode that occurred in [place] that sounds as if she might of had a panic attack.  She thinks that the difficulty getting a full breath could be related to bone spurs in her spine.

## 2023-08-31 NOTE — PHYSICAL EXAM
[de-identified] : On examination she has good chest expansion with a full breath.  With forward flexion of the spine she has a normal Schober index as the spine elongates quite well.  It is clear she does not have ankylosing spondylitis.  There is also no evidence of psoriasis which could stiffen her spine.  She has had multiple blood tests for rheumatologic disorders including scleroderma that I reviewed in the chart and there is no evidence that she has any of those conditions.  She also tells me that the breathing problem can be worse if she is sitting.  If it gets worse she feels better lying down on the floor. [de-identified] : I reviewed multiple prior chest x-rays when she had the empyema and the pleural effusion as well as a CAT scan of the chest.  The bony reconstructions show to be totally normal.  These tests were in 2018.  I do not think any further x-ray tests of her spine are indicated at this point.

## 2023-08-31 NOTE — DISCUSSION/SUMMARY
[de-identified] : I discussed with her that the symptoms could be related to anxiety.  The episode in [place] sounds like the possibility of a panic attack.  Seemingly her primary care physician also discussed this with her.  Currently it is caused her to be fearful of getting out of the house and driving.  I recommended that she return to her primary care physician and consider treating this with some antianxiety medications.

## 2023-11-29 ENCOUNTER — NON-APPOINTMENT (OUTPATIENT)
Age: 58
End: 2023-11-29

## 2023-11-29 ENCOUNTER — APPOINTMENT (OUTPATIENT)
Dept: OTOLARYNGOLOGY | Facility: CLINIC | Age: 58
End: 2023-11-29
Payer: COMMERCIAL

## 2023-11-29 VITALS
HEIGHT: 63 IN | SYSTOLIC BLOOD PRESSURE: 117 MMHG | HEART RATE: 80 BPM | BODY MASS INDEX: 24.45 KG/M2 | WEIGHT: 138 LBS | DIASTOLIC BLOOD PRESSURE: 71 MMHG

## 2023-11-29 DIAGNOSIS — J39.2 OTHER DISEASES OF PHARYNX: ICD-10-CM

## 2023-11-29 DIAGNOSIS — R06.89 OTHER ABNORMALITIES OF BREATHING: ICD-10-CM

## 2023-11-29 PROCEDURE — 31575 DIAGNOSTIC LARYNGOSCOPY: CPT

## 2023-11-29 PROCEDURE — 99203 OFFICE O/P NEW LOW 30 MIN: CPT | Mod: 25

## 2024-03-21 DIAGNOSIS — Z12.31 ENCOUNTER FOR SCREENING MAMMOGRAM FOR MALIGNANT NEOPLASM OF BREAST: ICD-10-CM

## 2024-05-09 ENCOUNTER — RESULT REVIEW (OUTPATIENT)
Age: 59
End: 2024-05-09

## 2024-05-09 ENCOUNTER — OUTPATIENT (OUTPATIENT)
Dept: OUTPATIENT SERVICES | Facility: HOSPITAL | Age: 59
LOS: 1 days | End: 2024-05-09
Payer: COMMERCIAL

## 2024-05-09 ENCOUNTER — APPOINTMENT (OUTPATIENT)
Dept: ULTRASOUND IMAGING | Facility: IMAGING CENTER | Age: 59
End: 2024-05-09
Payer: COMMERCIAL

## 2024-05-09 ENCOUNTER — APPOINTMENT (OUTPATIENT)
Dept: MAMMOGRAPHY | Facility: IMAGING CENTER | Age: 59
End: 2024-05-09
Payer: COMMERCIAL

## 2024-05-09 DIAGNOSIS — Z98.89 OTHER SPECIFIED POSTPROCEDURAL STATES: Chronic | ICD-10-CM

## 2024-05-09 DIAGNOSIS — Z90.711 ACQUIRED ABSENCE OF UTERUS WITH REMAINING CERVICAL STUMP: Chronic | ICD-10-CM

## 2024-05-09 DIAGNOSIS — Z00.8 ENCOUNTER FOR OTHER GENERAL EXAMINATION: ICD-10-CM

## 2024-05-09 PROCEDURE — 76641 ULTRASOUND BREAST COMPLETE: CPT | Mod: 26,50

## 2024-05-09 PROCEDURE — 77063 BREAST TOMOSYNTHESIS BI: CPT | Mod: 26

## 2024-05-09 PROCEDURE — 76641 ULTRASOUND BREAST COMPLETE: CPT

## 2024-05-09 PROCEDURE — 77067 SCR MAMMO BI INCL CAD: CPT | Mod: 26

## 2024-05-09 PROCEDURE — 77067 SCR MAMMO BI INCL CAD: CPT

## 2024-05-09 PROCEDURE — 77063 BREAST TOMOSYNTHESIS BI: CPT

## 2024-07-19 ENCOUNTER — APPOINTMENT (OUTPATIENT)
Dept: DERMATOLOGY | Facility: CLINIC | Age: 59
End: 2024-07-19

## 2024-07-19 DIAGNOSIS — D48.5 NEOPLASM OF UNCERTAIN BEHAVIOR OF SKIN: ICD-10-CM

## 2024-07-19 DIAGNOSIS — L91.8 OTHER HYPERTROPHIC DISORDERS OF THE SKIN: ICD-10-CM

## 2024-07-19 DIAGNOSIS — B07.9 VIRAL WART, UNSPECIFIED: ICD-10-CM

## 2024-07-19 DIAGNOSIS — Z12.83 ENCOUNTER FOR SCREENING FOR MALIGNANT NEOPLASM OF SKIN: ICD-10-CM

## 2024-07-19 DIAGNOSIS — D18.01 HEMANGIOMA OF SKIN AND SUBCUTANEOUS TISSUE: ICD-10-CM

## 2024-07-19 DIAGNOSIS — D22.9 MELANOCYTIC NEVI, UNSPECIFIED: ICD-10-CM

## 2024-07-19 PROCEDURE — 11102 TANGNTL BX SKIN SINGLE LES: CPT

## 2024-07-19 PROCEDURE — 99213 OFFICE O/P EST LOW 20 MIN: CPT | Mod: 25

## 2024-07-26 ENCOUNTER — NON-APPOINTMENT (OUTPATIENT)
Age: 59
End: 2024-07-26

## 2024-07-26 LAB — DERMATOLOGY BIOPSY: NORMAL

## 2024-08-16 ENCOUNTER — APPOINTMENT (OUTPATIENT)
Dept: OTOLARYNGOLOGY | Facility: CLINIC | Age: 59
End: 2024-08-16

## 2024-08-28 ENCOUNTER — APPOINTMENT (OUTPATIENT)
Dept: OBGYN | Facility: CLINIC | Age: 59
End: 2024-08-28
Payer: COMMERCIAL

## 2024-08-28 VITALS — SYSTOLIC BLOOD PRESSURE: 110 MMHG | HEART RATE: 75 BPM | DIASTOLIC BLOOD PRESSURE: 68 MMHG

## 2024-08-28 VITALS — WEIGHT: 138 LBS | HEIGHT: 63 IN | BODY MASS INDEX: 24.45 KG/M2

## 2024-08-28 DIAGNOSIS — Z01.419 ENCOUNTER FOR GYNECOLOGICAL EXAMINATION (GENERAL) (ROUTINE) W/OUT ABNORMAL FINDINGS: ICD-10-CM

## 2024-08-28 PROCEDURE — 99396 PREV VISIT EST AGE 40-64: CPT

## 2024-08-29 LAB — HPV HIGH+LOW RISK DNA PNL CVX: NOT DETECTED

## 2024-09-03 LAB — CYTOLOGY CVX/VAG DOC THIN PREP: ABNORMAL

## 2024-09-18 ENCOUNTER — APPOINTMENT (OUTPATIENT)
Dept: OTOLARYNGOLOGY | Facility: CLINIC | Age: 59
End: 2024-09-18
Payer: COMMERCIAL

## 2024-09-18 VITALS
BODY MASS INDEX: 23.03 KG/M2 | HEART RATE: 56 BPM | SYSTOLIC BLOOD PRESSURE: 102 MMHG | WEIGHT: 130 LBS | DIASTOLIC BLOOD PRESSURE: 63 MMHG | TEMPERATURE: 98 F

## 2024-09-18 DIAGNOSIS — H61.22 IMPACTED CERUMEN, LEFT EAR: ICD-10-CM

## 2024-09-18 DIAGNOSIS — R06.89 OTHER ABNORMALITIES OF BREATHING: ICD-10-CM

## 2024-09-18 DIAGNOSIS — J34.3 HYPERTROPHY OF NASAL TURBINATES: ICD-10-CM

## 2024-09-18 DIAGNOSIS — H93.8X2 OTHER SPECIFIED DISORDERS OF LEFT EAR: ICD-10-CM

## 2024-09-18 PROCEDURE — 99214 OFFICE O/P EST MOD 30 MIN: CPT | Mod: 25

## 2024-09-18 PROCEDURE — 31231 NASAL ENDOSCOPY DX: CPT

## 2024-09-18 PROCEDURE — 69210 REMOVE IMPACTED EAR WAX UNI: CPT

## 2024-09-18 RX ORDER — FLUTICASONE PROPIONATE 50 UG/1
50 SPRAY, METERED NASAL TWICE DAILY
Qty: 1 | Refills: 5 | Status: ACTIVE | COMMUNITY
Start: 2024-09-18 | End: 1900-01-01

## 2024-09-18 NOTE — PHYSICAL EXAM
[Normal] : mucosa is normal [Midline] : trachea located in midline position [de-identified] : left: wax

## 2024-09-18 NOTE — END OF VISIT
[FreeTextEntry3] : I personally saw and examined Ms. PAULY GARCÍA in detail this visit today. I personally reviewed the HPI, PMH, FH, SH, ROS and medications/allergies. I have spoken to RAJINDER Veliz regarding the history and have personally determined the assessment and plan of care, and documented this myself. I was present and participated in all key portions of the encounter and all procedures noted above. I have made changes in the body of the note where appropriate.   Attesting Faculty: See Attending Signature Below

## 2024-09-18 NOTE — ASSESSMENT
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

## 2024-09-18 NOTE — HISTORY OF PRESENT ILLNESS
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  First occurred 1.5 years ago lasts for a few weeks or months with no Sx.  Notes it is exacerbated sitting,and being indoors.  She notes laying flat supine improves symptoms.  Notes with flexes her head it is harder to breath, however, tilting head back improves her breathing.  Has seen a pulmonologist from [facility] and notes PFT's were normal.  Gave her DDx of asthma Vs RAD Vs anxiety. [FreeTextEntry1] : She is here for f/u. She feels she may have ear wax in the left ear. She states her left ear feels clogged that has been going on for a month or two. She states that feeling will come and go. She is still having the intermittent sensation of not being able take a deep breath. She feels when she has nasal congestion it makes her breathing sensation worse. She feels laying flat helps. She states she had a workup with the pulmonologist and allergist. She had a cxr that was normal. She was tested for asthma which was negative. She was tested for food and skin allergies, and everything was normal. She used Flonase but only for a few days.  Pt denies any nasal drainage, PND, facial pain or pressure, runny nose or sinus infections. no hx of acid reflux

## 2024-09-18 NOTE — REASON FOR VISIT
[Subsequent Evaluation] : a subsequent evaluation for [FreeTextEntry2] : diff breathing/ left ear fullness

## 2024-09-18 NOTE — PROCEDURE
[FreeTextEntry3] : left cerumen impaction removed with curette. After removal of cerumen canal noted to be normal without edema, purulence or inflammation. Patient tolerated procedure well. [FreeTextEntry6] : Reason for nasal endoscopy: anterior rhinoscopy insufficient to account for symptoms.   Flexible scope #2 was used. Right nasal passage with hypertrophic inferior, middle and superior turbinates. Nasal passage patent with clear middle meatus and sphenoethmoid recess. Left nasal passage with hypertrophic inferior, middle and superior turbinates. Nasal passage was patent with clear middle meatus and sphenoethmoid recess. No mucopurulence or polyps appreciated. Nasopharynx clear [de-identified] : Reason for laryngoscopy: Oral exam unable to account for symptoms.  Flexible scope #2 used. Passed through nasal passage and nasopharynx/oropharynx/hypopharynx clear. Supraglottis normal. Glottis with fully mobile vocal cords without lesions or masses. Visualized subglottis clear. Postcricoid area without erythema or edema. No pooling of secretions.

## 2024-12-01 NOTE — H&P ADULT - PROBLEM SELECTOR PLAN 1
external rotation -Fever of 102.3 + Leukocytosis with loculated pleural effusion findings on CTA Chest  -Hemodynamically stable, tolerating PO  -S/p Ceftriaxone + Azithromycin in the ED  -Continue Ceftriaxone + Azithromycin  -Follow up with Blood cultures, Urine Legionella Ag  -Discuss case with Pulmonology regarding Thoracentesis for Loculated pleural effusion  -Tylenol prn -Fever of 102.3 + Leukocytosis with loculated pleural effusion findings on CTA Chest  -Hemodynamically stable, tolerating PO  -S/p Ceftriaxone + Azithromycin in the ED  -Continue Ceftriaxone + Azithromycin  -Follow up with Blood cultures, Urine Legionella Ag  -I personally discussed the pt's case with pts PMD, Dr. Blanquita Jacob regarding Thoracentesis for Loculated pleural effusion. Dr. Jacob agrees and will contact Pulmonology.   -Tylenol prn

## 2025-03-27 ENCOUNTER — APPOINTMENT (OUTPATIENT)
Dept: OTOLARYNGOLOGY | Facility: CLINIC | Age: 60
End: 2025-03-27
Payer: COMMERCIAL

## 2025-03-27 DIAGNOSIS — J39.2 OTHER DISEASES OF PHARYNX: ICD-10-CM

## 2025-03-27 DIAGNOSIS — R09.A2 FOREIGN BODY SENSATION, THROAT: ICD-10-CM

## 2025-03-27 PROCEDURE — 31575 DIAGNOSTIC LARYNGOSCOPY: CPT

## 2025-03-27 PROCEDURE — 99213 OFFICE O/P EST LOW 20 MIN: CPT | Mod: 25

## 2025-05-08 ENCOUNTER — NON-APPOINTMENT (OUTPATIENT)
Age: 60
End: 2025-05-08

## 2025-05-08 ENCOUNTER — APPOINTMENT (OUTPATIENT)
Dept: DERMATOLOGY | Facility: CLINIC | Age: 60
End: 2025-05-08
Payer: COMMERCIAL

## 2025-05-08 DIAGNOSIS — R21 RASH AND OTHER NONSPECIFIC SKIN ERUPTION: ICD-10-CM

## 2025-05-08 DIAGNOSIS — L30.9 DERMATITIS, UNSPECIFIED: ICD-10-CM

## 2025-05-08 PROCEDURE — 99213 OFFICE O/P EST LOW 20 MIN: CPT

## 2025-05-08 PROCEDURE — 99203 OFFICE O/P NEW LOW 30 MIN: CPT

## 2025-05-09 ENCOUNTER — RESULT REVIEW (OUTPATIENT)
Age: 60
End: 2025-05-09

## 2025-05-09 ENCOUNTER — APPOINTMENT (OUTPATIENT)
Dept: MAMMOGRAPHY | Facility: IMAGING CENTER | Age: 60
End: 2025-05-09
Payer: COMMERCIAL

## 2025-05-09 ENCOUNTER — APPOINTMENT (OUTPATIENT)
Dept: ULTRASOUND IMAGING | Facility: IMAGING CENTER | Age: 60
End: 2025-05-09
Payer: COMMERCIAL

## 2025-05-09 ENCOUNTER — OUTPATIENT (OUTPATIENT)
Dept: OUTPATIENT SERVICES | Facility: HOSPITAL | Age: 60
LOS: 1 days | End: 2025-05-09
Payer: COMMERCIAL

## 2025-05-09 DIAGNOSIS — Z90.711 ACQUIRED ABSENCE OF UTERUS WITH REMAINING CERVICAL STUMP: Chronic | ICD-10-CM

## 2025-05-09 DIAGNOSIS — Z98.89 OTHER SPECIFIED POSTPROCEDURAL STATES: Chronic | ICD-10-CM

## 2025-05-09 DIAGNOSIS — Z00.8 ENCOUNTER FOR OTHER GENERAL EXAMINATION: ICD-10-CM

## 2025-05-09 DIAGNOSIS — Z12.31 ENCOUNTER FOR SCREENING MAMMOGRAM FOR MALIGNANT NEOPLASM OF BREAST: ICD-10-CM

## 2025-05-09 DIAGNOSIS — R92.30 DENSE BREASTS, UNSPECIFIED: ICD-10-CM

## 2025-05-09 PROCEDURE — 76641 ULTRASOUND BREAST COMPLETE: CPT | Mod: 26,50

## 2025-05-09 PROCEDURE — 77063 BREAST TOMOSYNTHESIS BI: CPT | Mod: 26

## 2025-05-09 PROCEDURE — 77067 SCR MAMMO BI INCL CAD: CPT | Mod: 26

## 2025-05-09 PROCEDURE — 77067 SCR MAMMO BI INCL CAD: CPT

## 2025-05-09 PROCEDURE — 77063 BREAST TOMOSYNTHESIS BI: CPT

## 2025-05-09 PROCEDURE — 76641 ULTRASOUND BREAST COMPLETE: CPT

## 2025-05-30 NOTE — ED ADULT NURSE NOTE - NS ED NURSE LEVEL OF CONSCIOUSNESS ORIENTATION
[Nutrition/ Exercise/ Weight Management] : nutrition, exercise, weight management [Vitamins/Supplements] : vitamins/supplements [Sunscreen] : sunscreen [Breast Self Exam] : breast self exam [Lab Results] : lab results Oriented - self; Oriented - place; Oriented - time

## 2025-09-12 ENCOUNTER — APPOINTMENT (OUTPATIENT)
Dept: DERMATOLOGY | Facility: CLINIC | Age: 60
End: 2025-09-12

## 2025-09-12 DIAGNOSIS — D22.9 MELANOCYTIC NEVI, UNSPECIFIED: ICD-10-CM

## 2025-09-12 DIAGNOSIS — D48.5 NEOPLASM OF UNCERTAIN BEHAVIOR OF SKIN: ICD-10-CM

## 2025-09-12 PROCEDURE — 11102 TANGNTL BX SKIN SINGLE LES: CPT

## 2025-09-12 PROCEDURE — 99213 OFFICE O/P EST LOW 20 MIN: CPT | Mod: 25
